# Patient Record
Sex: FEMALE | Race: WHITE | NOT HISPANIC OR LATINO | Employment: OTHER | URBAN - METROPOLITAN AREA
[De-identification: names, ages, dates, MRNs, and addresses within clinical notes are randomized per-mention and may not be internally consistent; named-entity substitution may affect disease eponyms.]

---

## 2019-01-01 ENCOUNTER — HOSPITAL ENCOUNTER (INPATIENT)
Facility: HOSPITAL | Age: 59
LOS: 1 days | DRG: 871 | End: 2019-05-23
Attending: EMERGENCY MEDICINE | Admitting: INTERNAL MEDICINE
Payer: MEDICARE

## 2019-01-01 VITALS
BODY MASS INDEX: 33.41 KG/M2 | TEMPERATURE: 94 F | HEART RATE: 31 BPM | WEIGHT: 207.88 LBS | RESPIRATION RATE: 30 BRPM | DIASTOLIC BLOOD PRESSURE: 56 MMHG | SYSTOLIC BLOOD PRESSURE: 63 MMHG | HEIGHT: 66 IN

## 2019-01-01 DIAGNOSIS — J96.01 ACUTE RESPIRATORY FAILURE WITH HYPOXIA AND HYPERCAPNIA: Primary | ICD-10-CM

## 2019-01-01 DIAGNOSIS — N17.9 AKI (ACUTE KIDNEY INJURY): ICD-10-CM

## 2019-01-01 DIAGNOSIS — R79.89 ELEVATED TROPONIN: ICD-10-CM

## 2019-01-01 DIAGNOSIS — I50.23 ACUTE ON CHRONIC SYSTOLIC HEART FAILURE: ICD-10-CM

## 2019-01-01 DIAGNOSIS — R40.2432 GLASGOW COMA SCALE TOTAL SCORE 3-8, AT ARRIVAL TO EMERGENCY DEPARTMENT: ICD-10-CM

## 2019-01-01 DIAGNOSIS — R57.0 CARDIOGENIC SHOCK: ICD-10-CM

## 2019-01-01 DIAGNOSIS — J96.02 ACUTE RESPIRATORY FAILURE WITH HYPOXIA AND HYPERCAPNIA: Primary | ICD-10-CM

## 2019-01-01 DIAGNOSIS — N17.9 ACUTE RENAL FAILURE, UNSPECIFIED ACUTE RENAL FAILURE TYPE: ICD-10-CM

## 2019-01-01 DIAGNOSIS — I50.9 ACUTE ON CHRONIC CONGESTIVE HEART FAILURE, UNSPECIFIED HEART FAILURE TYPE: ICD-10-CM

## 2019-01-01 DIAGNOSIS — J18.9 PNEUMONIA OF BOTH LUNGS DUE TO INFECTIOUS ORGANISM, UNSPECIFIED PART OF LUNG: ICD-10-CM

## 2019-01-01 DIAGNOSIS — E87.20 LACTIC ACIDOSIS: ICD-10-CM

## 2019-01-01 DIAGNOSIS — G93.1 HYPOXIC ENCEPHALOPATHY: ICD-10-CM

## 2019-01-01 DIAGNOSIS — R79.89 ABNORMAL LFTS: ICD-10-CM

## 2019-01-01 DIAGNOSIS — Z78.9 CENTRAL VENOUS CATHETER IN PLACE: ICD-10-CM

## 2019-01-01 DIAGNOSIS — I46.9 CARDIOPULMONARY ARREST WITH SUCCESSFUL RESUSCITATION: ICD-10-CM

## 2019-01-01 DIAGNOSIS — J96.01 ACUTE RESPIRATORY FAILURE WITH HYPOXIA AND HYPERCARBIA: ICD-10-CM

## 2019-01-01 DIAGNOSIS — R91.8 PULMONARY INFILTRATES ON CXR: ICD-10-CM

## 2019-01-01 DIAGNOSIS — A41.9 SEPSIS, DUE TO UNSPECIFIED ORGANISM: ICD-10-CM

## 2019-01-01 DIAGNOSIS — Z86.711 HISTORY OF PULMONARY EMBOLISM: Chronic | ICD-10-CM

## 2019-01-01 DIAGNOSIS — J96.02 ACUTE RESPIRATORY FAILURE WITH HYPOXIA AND HYPERCARBIA: ICD-10-CM

## 2019-01-01 DIAGNOSIS — E87.20 METABOLIC ACIDOSIS: ICD-10-CM

## 2019-01-01 DIAGNOSIS — K59.09 CHRONIC CONSTIPATION: Chronic | ICD-10-CM

## 2019-01-01 DIAGNOSIS — R79.89 ELEVATED BRAIN NATRIURETIC PEPTIDE (BNP) LEVEL: ICD-10-CM

## 2019-01-01 DIAGNOSIS — E87.29 METABOLIC ACIDOSIS, INCREASED ANION GAP (IAG): ICD-10-CM

## 2019-01-01 DIAGNOSIS — I46.9 CARDIAC ARREST: ICD-10-CM

## 2019-01-01 DIAGNOSIS — K72.00 SHOCK LIVER: ICD-10-CM

## 2019-01-01 DIAGNOSIS — Z95.2 HX OF REPLACEMENT OF AORTIC VALVE: Chronic | ICD-10-CM

## 2019-01-01 DIAGNOSIS — G40.909 SEIZURE DISORDER: Chronic | ICD-10-CM

## 2019-01-01 DIAGNOSIS — R57.9 SHOCK: ICD-10-CM

## 2019-01-01 LAB
ALBUMIN SERPL BCP-MCNC: 2.5 G/DL (ref 3.5–5.2)
ALBUMIN SERPL BCP-MCNC: 2.6 G/DL (ref 3.5–5.2)
ALBUMIN SERPL BCP-MCNC: 3.2 G/DL (ref 3.5–5.2)
ALLENS TEST: ABNORMAL
ALP SERPL-CCNC: 234 U/L (ref 55–135)
ALP SERPL-CCNC: 258 U/L (ref 55–135)
ALP SERPL-CCNC: 286 U/L (ref 55–135)
ALT SERPL W/O P-5'-P-CCNC: 1645 U/L (ref 10–44)
ALT SERPL W/O P-5'-P-CCNC: 1769 U/L (ref 10–44)
ALT SERPL W/O P-5'-P-CCNC: 796 U/L (ref 10–44)
AMPHET+METHAMPHET UR QL: NEGATIVE
ANION GAP SERPL CALC-SCNC: 28 MMOL/L (ref 8–16)
ANION GAP SERPL CALC-SCNC: 28 MMOL/L (ref 8–16)
ANION GAP SERPL CALC-SCNC: 29 MMOL/L (ref 8–16)
ANION GAP SERPL CALC-SCNC: 31 MMOL/L (ref 8–16)
ANION GAP SERPL CALC-SCNC: 32 MMOL/L (ref 8–16)
ANION GAP SERPL CALC-SCNC: 36 MMOL/L (ref 8–16)
ANISOCYTOSIS BLD QL SMEAR: SLIGHT
ANISOCYTOSIS BLD QL SMEAR: SLIGHT
AORTIC VALVE REGURGITATION: ABNORMAL
APTT BLDCRRT: 104.5 SEC (ref 21–32)
APTT BLDCRRT: 137.5 SEC (ref 21–32)
APTT BLDCRRT: 137.5 SEC (ref 21–32)
APTT BLDCRRT: 53.5 SEC (ref 21–32)
APTT BLDCRRT: 68.6 SEC (ref 21–32)
AST SERPL-CCNC: 2959 U/L (ref 10–40)
AST SERPL-CCNC: 3503 U/L (ref 10–40)
AST SERPL-CCNC: 650 U/L (ref 10–40)
BACTERIA #/AREA URNS HPF: ABNORMAL /HPF
BARBITURATES UR QL SCN>200 NG/ML: NEGATIVE
BASOPHILS # BLD AUTO: 0.02 K/UL (ref 0–0.2)
BASOPHILS # BLD AUTO: 0.04 K/UL (ref 0–0.2)
BASOPHILS NFR BLD: 0.1 % (ref 0–1.9)
BASOPHILS NFR BLD: 0.4 % (ref 0–1.9)
BENZODIAZ UR QL SCN>200 NG/ML: NORMAL
BILIRUB SERPL-MCNC: 1.4 MG/DL (ref 0.1–1)
BILIRUB SERPL-MCNC: 2.1 MG/DL (ref 0.1–1)
BILIRUB SERPL-MCNC: 2.2 MG/DL (ref 0.1–1)
BILIRUB UR QL STRIP: NEGATIVE
BNP SERPL-MCNC: 2918 PG/ML (ref 0–99)
BUN SERPL-MCNC: 13 MG/DL (ref 6–20)
BUN SERPL-MCNC: 13 MG/DL (ref 6–20)
BUN SERPL-MCNC: 14 MG/DL (ref 6–20)
BUN SERPL-MCNC: 17 MG/DL (ref 6–20)
BUN SERPL-MCNC: 18 MG/DL (ref 6–20)
BUN SERPL-MCNC: 19 MG/DL (ref 6–20)
BURR CELLS BLD QL SMEAR: ABNORMAL
BURR CELLS BLD QL SMEAR: ABNORMAL
BZE UR QL SCN: NEGATIVE
CALCIUM SERPL-MCNC: 7 MG/DL (ref 8.7–10.5)
CALCIUM SERPL-MCNC: 7.4 MG/DL (ref 8.7–10.5)
CALCIUM SERPL-MCNC: 7.6 MG/DL (ref 8.7–10.5)
CALCIUM SERPL-MCNC: 7.9 MG/DL (ref 8.7–10.5)
CALCIUM SERPL-MCNC: 8 MG/DL (ref 8.7–10.5)
CALCIUM SERPL-MCNC: 9.1 MG/DL (ref 8.7–10.5)
CANNABINOIDS UR QL SCN: NEGATIVE
CHLORIDE SERPL-SCNC: 87 MMOL/L (ref 95–110)
CHLORIDE SERPL-SCNC: 94 MMOL/L (ref 95–110)
CHLORIDE SERPL-SCNC: 94 MMOL/L (ref 95–110)
CHLORIDE SERPL-SCNC: 96 MMOL/L (ref 95–110)
CHLORIDE SERPL-SCNC: 97 MMOL/L (ref 95–110)
CK SERPL-CCNC: 130 U/L (ref 20–180)
CLARITY UR: CLEAR
CO2 SERPL-SCNC: 5 MMOL/L (ref 23–29)
CO2 SERPL-SCNC: 7 MMOL/L (ref 23–29)
CO2 SERPL-SCNC: 7 MMOL/L (ref 23–29)
CO2 SERPL-SCNC: 8 MMOL/L (ref 23–29)
CO2 SERPL-SCNC: 8 MMOL/L (ref 23–29)
COLOR UR: YELLOW
CREAT SERPL-MCNC: 1.9 MG/DL (ref 0.5–1.4)
CREAT SERPL-MCNC: 2 MG/DL (ref 0.5–1.4)
CREAT SERPL-MCNC: 2.1 MG/DL (ref 0.5–1.4)
CREAT SERPL-MCNC: 2.2 MG/DL (ref 0.5–1.4)
CREAT SERPL-MCNC: 2.4 MG/DL (ref 0.5–1.4)
CREAT UR-MCNC: 63.5 MG/DL (ref 15–325)
DELSYS: ABNORMAL
DIFFERENTIAL METHOD: ABNORMAL
DIFFERENTIAL METHOD: ABNORMAL
EOSINOPHIL # BLD AUTO: 0 K/UL (ref 0–0.5)
EOSINOPHIL # BLD AUTO: 0 K/UL (ref 0–0.5)
EOSINOPHIL NFR BLD: 0.1 % (ref 0–8)
EOSINOPHIL NFR BLD: 0.3 % (ref 0–8)
ERYTHROCYTE [DISTWIDTH] IN BLOOD BY AUTOMATED COUNT: 15.5 % (ref 11.5–14.5)
ERYTHROCYTE [DISTWIDTH] IN BLOOD BY AUTOMATED COUNT: 15.8 % (ref 11.5–14.5)
ERYTHROCYTE [SEDIMENTATION RATE] IN BLOOD BY WESTERGREN METHOD: 16 MM/H
ERYTHROCYTE [SEDIMENTATION RATE] IN BLOOD BY WESTERGREN METHOD: 24 MM/H
ERYTHROCYTE [SEDIMENTATION RATE] IN BLOOD BY WESTERGREN METHOD: 30 MM/H
EST. GFR  (AFRICAN AMERICAN): 25 ML/MIN/1.73 M^2
EST. GFR  (AFRICAN AMERICAN): 28 ML/MIN/1.73 M^2
EST. GFR  (AFRICAN AMERICAN): 30 ML/MIN/1.73 M^2
EST. GFR  (AFRICAN AMERICAN): 31 ML/MIN/1.73 M^2
EST. GFR  (AFRICAN AMERICAN): 33 ML/MIN/1.73 M^2
EST. GFR  (NON AFRICAN AMERICAN): 22 ML/MIN/1.73 M^2
EST. GFR  (NON AFRICAN AMERICAN): 24 ML/MIN/1.73 M^2
EST. GFR  (NON AFRICAN AMERICAN): 26 ML/MIN/1.73 M^2
EST. GFR  (NON AFRICAN AMERICAN): 27 ML/MIN/1.73 M^2
EST. GFR  (NON AFRICAN AMERICAN): 29 ML/MIN/1.73 M^2
ESTIMATED AVG GLUCOSE: 100 MG/DL (ref 68–131)
ESTIMATED PA SYSTOLIC PRESSURE: 69.22
FIO2: 100
FIO2: 40
FIO2: 40
FIO2: 55
GLOBAL PERICARDIAL EFFUSION: ABNORMAL
GLUCOSE SERPL-MCNC: 120 MG/DL (ref 70–110)
GLUCOSE SERPL-MCNC: 136 MG/DL (ref 70–110)
GLUCOSE SERPL-MCNC: 182 MG/DL (ref 70–110)
GLUCOSE SERPL-MCNC: 232 MG/DL (ref 70–110)
GLUCOSE SERPL-MCNC: 67 MG/DL (ref 70–110)
GLUCOSE SERPL-MCNC: 79 MG/DL (ref 70–110)
GLUCOSE SERPL-MCNC: 90 MG/DL (ref 70–110)
GLUCOSE SERPL-MCNC: 92 MG/DL (ref 70–110)
GLUCOSE UR QL STRIP: NEGATIVE
HBA1C MFR BLD HPLC: 5.1 % (ref 4–5.6)
HCO3 UR-SCNC: 10.9 MMOL/L (ref 24–28)
HCO3 UR-SCNC: 6.1 MMOL/L (ref 24–28)
HCO3 UR-SCNC: 6.9 MMOL/L (ref 24–28)
HCO3 UR-SCNC: 7.1 MMOL/L (ref 24–28)
HCO3 UR-SCNC: 7.5 MMOL/L (ref 24–28)
HCO3 UR-SCNC: 8.1 MMOL/L (ref 24–28)
HCO3 UR-SCNC: 8.6 MMOL/L (ref 24–28)
HCO3 UR-SCNC: 9.2 MMOL/L (ref 24–28)
HCT VFR BLD AUTO: 43.9 % (ref 37–48.5)
HCT VFR BLD AUTO: 47.5 % (ref 37–48.5)
HCT VFR BLD CALC: 36 %PCV (ref 36–54)
HCT VFR BLD CALC: 45 %PCV (ref 36–54)
HCV AB SERPL QL IA: NEGATIVE
HGB BLD-MCNC: 12.6 G/DL (ref 12–16)
HGB BLD-MCNC: 14.2 G/DL (ref 12–16)
HGB UR QL STRIP: NEGATIVE
HIV 1+2 AB+HIV1 P24 AG SERPL QL IA: NEGATIVE
HYALINE CASTS #/AREA URNS LPF: 0 /LPF
INR PPP: 1.8 (ref 0.8–1.2)
INR PPP: 3.2 (ref 0.8–1.2)
KETONES UR QL STRIP: ABNORMAL
LACTATE SERPL-SCNC: >12 MMOL/L (ref 0.5–2.2)
LEUKOCYTE ESTERASE UR QL STRIP: NEGATIVE
LYMPHOCYTES # BLD AUTO: 2 K/UL (ref 1–4.8)
LYMPHOCYTES # BLD AUTO: 2.4 K/UL (ref 1–4.8)
LYMPHOCYTES NFR BLD: 11.9 % (ref 18–48)
LYMPHOCYTES NFR BLD: 21.2 % (ref 18–48)
MAGNESIUM SERPL-MCNC: 1.6 MG/DL (ref 1.6–2.6)
MAGNESIUM SERPL-MCNC: 2.2 MG/DL (ref 1.6–2.6)
MAGNESIUM SERPL-MCNC: 2.2 MG/DL (ref 1.6–2.6)
MAGNESIUM SERPL-MCNC: 2.4 MG/DL (ref 1.6–2.6)
MAGNESIUM SERPL-MCNC: 3.1 MG/DL (ref 1.6–2.6)
MAGNESIUM SERPL-MCNC: 3.2 MG/DL (ref 1.6–2.6)
MCH RBC QN AUTO: 31.2 PG (ref 27–31)
MCH RBC QN AUTO: 31.3 PG (ref 27–31)
MCHC RBC AUTO-ENTMCNC: 28.7 G/DL (ref 32–36)
MCHC RBC AUTO-ENTMCNC: 29.9 G/DL (ref 32–36)
MCV RBC AUTO: 105 FL (ref 82–98)
MCV RBC AUTO: 109 FL (ref 82–98)
METHADONE UR QL SCN>300 NG/ML: NEGATIVE
MICROSCOPIC COMMENT: ABNORMAL
MITRAL VALVE MOBILITY: ABNORMAL
MITRAL VALVE STENOSIS: ABNORMAL
MODE: ABNORMAL
MONOCYTES # BLD AUTO: 1 K/UL (ref 0.3–1)
MONOCYTES # BLD AUTO: 1.5 K/UL (ref 0.3–1)
MONOCYTES NFR BLD: 13.3 % (ref 4–15)
MONOCYTES NFR BLD: 6.2 % (ref 4–15)
NEUTROPHILS # BLD AUTO: 13.7 K/UL (ref 1.8–7.7)
NEUTROPHILS # BLD AUTO: 7.4 K/UL (ref 1.8–7.7)
NEUTROPHILS NFR BLD: 68.3 % (ref 38–73)
NEUTROPHILS NFR BLD: 83.5 % (ref 38–73)
NITRITE UR QL STRIP: NEGATIVE
OPIATES UR QL SCN: NORMAL
PCO2 BLDA: 27.6 MMHG (ref 35–45)
PCO2 BLDA: 34.9 MMHG (ref 35–45)
PCO2 BLDA: 36.5 MMHG (ref 35–45)
PCO2 BLDA: 40.8 MMHG (ref 35–45)
PCO2 BLDA: 42.7 MMHG (ref 35–45)
PCO2 BLDA: 45 MMHG (ref 35–45)
PCO2 BLDA: 45.7 MMHG (ref 35–45)
PCO2 BLDA: 53.6 MMHG (ref 35–45)
PCP UR QL SCN>25 NG/ML: NEGATIVE
PEEP: 10
PEEP: 12
PH SMN: 6.83 [PH] (ref 7.35–7.45)
PH SMN: 6.85 [PH] (ref 7.35–7.45)
PH SMN: 6.91 [PH] (ref 7.35–7.45)
PH SMN: 6.92 [PH] (ref 7.35–7.45)
PH SMN: 6.95 [PH] (ref 7.35–7.45)
PH UR STRIP: 6 [PH] (ref 5–8)
PHOSPHATE SERPL-MCNC: 10.7 MG/DL (ref 2.7–4.5)
PHOSPHATE SERPL-MCNC: 8.1 MG/DL (ref 2.7–4.5)
PHOSPHATE SERPL-MCNC: 8.7 MG/DL (ref 2.7–4.5)
PLATELET # BLD AUTO: 167 K/UL (ref 150–350)
PLATELET # BLD AUTO: 84 K/UL (ref 150–350)
PLATELET BLD QL SMEAR: ABNORMAL
PMV BLD AUTO: 11.7 FL (ref 9.2–12.9)
PMV BLD AUTO: 11.8 FL (ref 9.2–12.9)
PO2 BLDA: 117 MMHG (ref 80–100)
PO2 BLDA: 120 MMHG (ref 80–100)
PO2 BLDA: 142 MMHG (ref 80–100)
PO2 BLDA: 175 MMHG (ref 80–100)
PO2 BLDA: 192 MMHG (ref 80–100)
PO2 BLDA: 396 MMHG (ref 80–100)
PO2 BLDA: 85 MMHG (ref 80–100)
PO2 BLDA: 89 MMHG (ref 80–100)
POC BE: -22 MMOL/L
POC BE: -23 MMOL/L
POC BE: -24 MMOL/L
POC BE: -25 MMOL/L
POC BE: -26 MMOL/L
POC BE: -27 MMOL/L
POC IONIZED CALCIUM: 0.91 MMOL/L (ref 1.06–1.42)
POC IONIZED CALCIUM: 1.01 MMOL/L (ref 1.06–1.42)
POC SATURATED O2: 100 % (ref 95–100)
POC SATURATED O2: 87 % (ref 95–100)
POC SATURATED O2: 88 % (ref 95–100)
POC SATURATED O2: 94 % (ref 95–100)
POC SATURATED O2: 95 % (ref 95–100)
POC SATURATED O2: 97 % (ref 95–100)
POC SATURATED O2: 98 % (ref 95–100)
POC SATURATED O2: 99 % (ref 95–100)
POCT GLUCOSE: 152 MG/DL (ref 70–110)
POCT GLUCOSE: 171 MG/DL (ref 70–110)
POCT GLUCOSE: 199 MG/DL (ref 70–110)
POCT GLUCOSE: 239 MG/DL (ref 70–110)
POCT GLUCOSE: 61 MG/DL (ref 70–110)
POCT GLUCOSE: 70 MG/DL (ref 70–110)
POCT GLUCOSE: 76 MG/DL (ref 70–110)
POCT GLUCOSE: 89 MG/DL (ref 70–110)
POCT GLUCOSE: 97 MG/DL (ref 70–110)
POCT GLUCOSE: 98 MG/DL (ref 70–110)
POIKILOCYTOSIS BLD QL SMEAR: SLIGHT
POIKILOCYTOSIS BLD QL SMEAR: SLIGHT
POLYCHROMASIA BLD QL SMEAR: ABNORMAL
POLYCHROMASIA BLD QL SMEAR: ABNORMAL
POTASSIUM BLD-SCNC: 4.2 MMOL/L (ref 3.5–5.1)
POTASSIUM BLD-SCNC: 5 MMOL/L (ref 3.5–5.1)
POTASSIUM SERPL-SCNC: 4 MMOL/L (ref 3.5–5.1)
POTASSIUM SERPL-SCNC: 4.2 MMOL/L (ref 3.5–5.1)
POTASSIUM SERPL-SCNC: 4.3 MMOL/L (ref 3.5–5.1)
POTASSIUM SERPL-SCNC: 4.6 MMOL/L (ref 3.5–5.1)
POTASSIUM SERPL-SCNC: 4.7 MMOL/L (ref 3.5–5.1)
POTASSIUM SERPL-SCNC: 5.6 MMOL/L (ref 3.5–5.1)
PROT SERPL-MCNC: 5.5 G/DL (ref 6–8.4)
PROT SERPL-MCNC: 5.8 G/DL (ref 6–8.4)
PROT SERPL-MCNC: 7.1 G/DL (ref 6–8.4)
PROT UR QL STRIP: ABNORMAL
PROTHROMBIN TIME: 19.6 SEC (ref 9–12.5)
PROTHROMBIN TIME: 32.9 SEC (ref 9–12.5)
RBC # BLD AUTO: 4.04 M/UL (ref 4–5.4)
RBC # BLD AUTO: 4.54 M/UL (ref 4–5.4)
RBC #/AREA URNS HPF: 8 /HPF (ref 0–4)
RETIRED EF AND QEF - SEE NOTES: 20 (ref 55–65)
SAMPLE: ABNORMAL
SITE: ABNORMAL
SODIUM BLD-SCNC: 127 MMOL/L (ref 136–145)
SODIUM BLD-SCNC: 130 MMOL/L (ref 136–145)
SODIUM SERPL-SCNC: 130 MMOL/L (ref 136–145)
SODIUM SERPL-SCNC: 131 MMOL/L (ref 136–145)
SODIUM SERPL-SCNC: 131 MMOL/L (ref 136–145)
SODIUM SERPL-SCNC: 132 MMOL/L (ref 136–145)
SODIUM SERPL-SCNC: 132 MMOL/L (ref 136–145)
SODIUM SERPL-SCNC: 133 MMOL/L (ref 136–145)
SP GR UR STRIP: >=1.03 (ref 1–1.03)
TARGETS BLD QL SMEAR: ABNORMAL
TOXICOLOGY INFORMATION: NORMAL
TRICUSPID VALVE REGURGITATION: ABNORMAL
TROPONIN I SERPL DL<=0.01 NG/ML-MCNC: 0.07 NG/ML (ref 0–0.03)
TROPONIN I SERPL DL<=0.01 NG/ML-MCNC: 0.44 NG/ML (ref 0–0.03)
TSH SERPL DL<=0.005 MIU/L-ACNC: 1.54 UIU/ML (ref 0.4–4)
URN SPEC COLLECT METH UR: ABNORMAL
UROBILINOGEN UR STRIP-ACNC: ABNORMAL EU/DL
VT: 350
VT: 400
VT: 400
WBC # BLD AUTO: 11.39 K/UL (ref 3.9–12.7)
WBC # BLD AUTO: 16.79 K/UL (ref 3.9–12.7)
WBC #/AREA URNS HPF: 1 /HPF (ref 0–5)

## 2019-01-01 PROCEDURE — 37799 UNLISTED PX VASCULAR SURGERY: CPT

## 2019-01-01 PROCEDURE — 99292 CRITICAL CARE ADDL 30 MIN: CPT | Mod: ,,, | Performed by: NURSE PRACTITIONER

## 2019-01-01 PROCEDURE — 90937 PR HEMODIALYSIS, REPEATED EVAL.: ICD-10-PCS | Mod: ,,, | Performed by: INTERNAL MEDICINE

## 2019-01-01 PROCEDURE — 80048 BASIC METABOLIC PNL TOTAL CA: CPT

## 2019-01-01 PROCEDURE — 83520 IMMUNOASSAY QUANT NOS NONAB: CPT

## 2019-01-01 PROCEDURE — 63600175 PHARM REV CODE 636 W HCPCS: Performed by: INTERNAL MEDICINE

## 2019-01-01 PROCEDURE — 93010 ELECTROCARDIOGRAM REPORT: CPT | Mod: ,,, | Performed by: INTERNAL MEDICINE

## 2019-01-01 PROCEDURE — 25000242 PHARM REV CODE 250 ALT 637 W/ HCPCS: Performed by: INTERNAL MEDICINE

## 2019-01-01 PROCEDURE — 83605 ASSAY OF LACTIC ACID: CPT

## 2019-01-01 PROCEDURE — 36620 INSERTION CATHETER ARTERY: CPT | Mod: 59,,, | Performed by: NURSE PRACTITIONER

## 2019-01-01 PROCEDURE — 82800 BLOOD PH: CPT

## 2019-01-01 PROCEDURE — 80307 DRUG TEST PRSMV CHEM ANLYZR: CPT

## 2019-01-01 PROCEDURE — 99900035 HC TECH TIME PER 15 MIN (STAT)

## 2019-01-01 PROCEDURE — 36556 PR INSERT NON-TUNNEL CV CATH 5+ YRS OLD: ICD-10-PCS | Mod: ,,, | Performed by: NURSE PRACTITIONER

## 2019-01-01 PROCEDURE — 85610 PROTHROMBIN TIME: CPT

## 2019-01-01 PROCEDURE — 83735 ASSAY OF MAGNESIUM: CPT | Mod: 91

## 2019-01-01 PROCEDURE — 99900026 HC AIRWAY MAINTENANCE (STAT)

## 2019-01-01 PROCEDURE — 63600175 PHARM REV CODE 636 W HCPCS: Performed by: NURSE PRACTITIONER

## 2019-01-01 PROCEDURE — 93010 EKG 12-LEAD: ICD-10-PCS | Mod: ,,, | Performed by: INTERNAL MEDICINE

## 2019-01-01 PROCEDURE — 99291 PR CRITICAL CARE, E/M 30-74 MINUTES: ICD-10-PCS | Mod: 25,,, | Performed by: NURSE PRACTITIONER

## 2019-01-01 PROCEDURE — 83880 ASSAY OF NATRIURETIC PEPTIDE: CPT

## 2019-01-01 PROCEDURE — 25000003 PHARM REV CODE 250: Performed by: INTERNAL MEDICINE

## 2019-01-01 PROCEDURE — 84295 ASSAY OF SERUM SODIUM: CPT

## 2019-01-01 PROCEDURE — 99291 CRITICAL CARE FIRST HOUR: CPT | Mod: ,,, | Performed by: INTERNAL MEDICINE

## 2019-01-01 PROCEDURE — 99292 PR CRITICAL CARE, ADDL 30 MIN: ICD-10-PCS | Mod: 25,,, | Performed by: NURSE PRACTITIONER

## 2019-01-01 PROCEDURE — 80053 COMPREHEN METABOLIC PANEL: CPT | Mod: 91

## 2019-01-01 PROCEDURE — 84100 ASSAY OF PHOSPHORUS: CPT

## 2019-01-01 PROCEDURE — 36415 COLL VENOUS BLD VENIPUNCTURE: CPT

## 2019-01-01 PROCEDURE — 85730 THROMBOPLASTIN TIME PARTIAL: CPT | Mod: 91

## 2019-01-01 PROCEDURE — 93306 2D ECHO WITH COLOR FLOW DOPPLER: ICD-10-PCS | Mod: 26,,, | Performed by: INTERNAL MEDICINE

## 2019-01-01 PROCEDURE — 93005 ELECTROCARDIOGRAM TRACING: CPT

## 2019-01-01 PROCEDURE — 36600 WITHDRAWAL OF ARTERIAL BLOOD: CPT

## 2019-01-01 PROCEDURE — 80053 COMPREHEN METABOLIC PANEL: CPT

## 2019-01-01 PROCEDURE — 87070 CULTURE OTHR SPECIMN AEROBIC: CPT

## 2019-01-01 PROCEDURE — 83735 ASSAY OF MAGNESIUM: CPT

## 2019-01-01 PROCEDURE — 82803 BLOOD GASES ANY COMBINATION: CPT

## 2019-01-01 PROCEDURE — 99291 CRITICAL CARE FIRST HOUR: CPT | Mod: 25,,, | Performed by: INTERNAL MEDICINE

## 2019-01-01 PROCEDURE — 87040 BLOOD CULTURE FOR BACTERIA: CPT | Mod: 59

## 2019-01-01 PROCEDURE — 90937 HEMODIALYSIS REPEATED EVAL: CPT | Mod: ,,, | Performed by: INTERNAL MEDICINE

## 2019-01-01 PROCEDURE — 25000003 PHARM REV CODE 250: Performed by: NURSE PRACTITIONER

## 2019-01-01 PROCEDURE — 83036 HEMOGLOBIN GLYCOSYLATED A1C: CPT

## 2019-01-01 PROCEDURE — 99291 PR CRITICAL CARE, E/M 30-74 MINUTES: ICD-10-PCS | Mod: 25,,, | Performed by: INTERNAL MEDICINE

## 2019-01-01 PROCEDURE — 99291 PR CRITICAL CARE, E/M 30-74 MINUTES: ICD-10-PCS | Mod: ,,, | Performed by: NURSE PRACTITIONER

## 2019-01-01 PROCEDURE — 82330 ASSAY OF CALCIUM: CPT

## 2019-01-01 PROCEDURE — 36556 INSERT NON-TUNNEL CV CATH: CPT | Mod: ,,, | Performed by: NURSE PRACTITIONER

## 2019-01-01 PROCEDURE — 94640 AIRWAY INHALATION TREATMENT: CPT

## 2019-01-01 PROCEDURE — 85025 COMPLETE CBC W/AUTO DIFF WBC: CPT

## 2019-01-01 PROCEDURE — 93306 TTE W/DOPPLER COMPLETE: CPT | Mod: 26,,, | Performed by: INTERNAL MEDICINE

## 2019-01-01 PROCEDURE — 85730 THROMBOPLASTIN TIME PARTIAL: CPT

## 2019-01-01 PROCEDURE — 84484 ASSAY OF TROPONIN QUANT: CPT

## 2019-01-01 PROCEDURE — 99291 CRITICAL CARE FIRST HOUR: CPT | Mod: 25,,, | Performed by: NURSE PRACTITIONER

## 2019-01-01 PROCEDURE — 90945 DIALYSIS ONE EVALUATION: CPT

## 2019-01-01 PROCEDURE — 36620 PR INSERT CATH,ART,PERCUT,SHORTTERM: ICD-10-PCS | Mod: 59,,, | Performed by: NURSE PRACTITIONER

## 2019-01-01 PROCEDURE — 84100 ASSAY OF PHOSPHORUS: CPT | Mod: 91

## 2019-01-01 PROCEDURE — 20000000 HC ICU ROOM

## 2019-01-01 PROCEDURE — 96365 THER/PROPH/DIAG IV INF INIT: CPT | Mod: 59

## 2019-01-01 PROCEDURE — 63600175 PHARM REV CODE 636 W HCPCS: Performed by: EMERGENCY MEDICINE

## 2019-01-01 PROCEDURE — 96367 TX/PROPH/DG ADDL SEQ IV INF: CPT

## 2019-01-01 PROCEDURE — 87205 SMEAR GRAM STAIN: CPT

## 2019-01-01 PROCEDURE — 84132 ASSAY OF SERUM POTASSIUM: CPT

## 2019-01-01 PROCEDURE — 51702 INSERT TEMP BLADDER CATH: CPT

## 2019-01-01 PROCEDURE — S0028 INJECTION, FAMOTIDINE, 20 MG: HCPCS | Performed by: INTERNAL MEDICINE

## 2019-01-01 PROCEDURE — 84484 ASSAY OF TROPONIN QUANT: CPT | Mod: 91

## 2019-01-01 PROCEDURE — 94002 VENT MGMT INPAT INIT DAY: CPT

## 2019-01-01 PROCEDURE — 83605 ASSAY OF LACTIC ACID: CPT | Mod: 91

## 2019-01-01 PROCEDURE — 86703 HIV-1/HIV-2 1 RESULT ANTBDY: CPT

## 2019-01-01 PROCEDURE — 84443 ASSAY THYROID STIM HORMONE: CPT

## 2019-01-01 PROCEDURE — 93306 TTE W/DOPPLER COMPLETE: CPT

## 2019-01-01 PROCEDURE — 80048 BASIC METABOLIC PNL TOTAL CA: CPT | Mod: 91

## 2019-01-01 PROCEDURE — 36620 INSERTION CATHETER ARTERY: CPT

## 2019-01-01 PROCEDURE — 99291 CRITICAL CARE FIRST HOUR: CPT | Mod: ,,, | Performed by: NURSE PRACTITIONER

## 2019-01-01 PROCEDURE — 82550 ASSAY OF CK (CPK): CPT

## 2019-01-01 PROCEDURE — 94003 VENT MGMT INPAT SUBQ DAY: CPT

## 2019-01-01 PROCEDURE — 81000 URINALYSIS NONAUTO W/SCOPE: CPT | Mod: 59

## 2019-01-01 PROCEDURE — 93041 RHYTHM ECG TRACING: CPT

## 2019-01-01 PROCEDURE — 36556 INSERT NON-TUNNEL CV CATH: CPT

## 2019-01-01 PROCEDURE — 99291 CRITICAL CARE FIRST HOUR: CPT

## 2019-01-01 PROCEDURE — 99291 PR CRITICAL CARE, E/M 30-74 MINUTES: ICD-10-PCS | Mod: ,,, | Performed by: INTERNAL MEDICINE

## 2019-01-01 PROCEDURE — 99292 PR CRITICAL CARE, ADDL 30 MIN: ICD-10-PCS | Mod: ,,, | Performed by: NURSE PRACTITIONER

## 2019-01-01 PROCEDURE — 86803 HEPATITIS C AB TEST: CPT

## 2019-01-01 PROCEDURE — 85014 HEMATOCRIT: CPT

## 2019-01-01 PROCEDURE — 96361 HYDRATE IV INFUSION ADD-ON: CPT

## 2019-01-01 PROCEDURE — 25000003 PHARM REV CODE 250: Performed by: EMERGENCY MEDICINE

## 2019-01-01 PROCEDURE — 99292 CRITICAL CARE ADDL 30 MIN: CPT | Mod: 25,,, | Performed by: NURSE PRACTITIONER

## 2019-01-01 PROCEDURE — 96375 TX/PRO/DX INJ NEW DRUG ADDON: CPT

## 2019-01-01 PROCEDURE — 82962 GLUCOSE BLOOD TEST: CPT

## 2019-01-01 RX ORDER — MAGNESIUM SULFATE HEPTAHYDRATE 40 MG/ML
2 INJECTION, SOLUTION INTRAVENOUS
Status: DISCONTINUED | OUTPATIENT
Start: 2019-01-01 | End: 2019-01-01

## 2019-01-01 RX ORDER — SODIUM CHLORIDE 9 MG/ML
INJECTION, SOLUTION INTRAVENOUS
Status: COMPLETED | OUTPATIENT
Start: 2019-01-01 | End: 2019-01-01

## 2019-01-01 RX ORDER — DIVALPROEX SODIUM 125 MG/1
250 CAPSULE, COATED PELLETS ORAL 3 TIMES DAILY
Status: ON HOLD | COMMUNITY
End: 2019-01-01 | Stop reason: HOSPADM

## 2019-01-01 RX ORDER — CHLORHEXIDINE GLUCONATE ORAL RINSE 1.2 MG/ML
15 SOLUTION DENTAL 2 TIMES DAILY
Status: DISCONTINUED | OUTPATIENT
Start: 2019-01-01 | End: 2019-01-01

## 2019-01-01 RX ORDER — MAGNESIUM SULFATE HEPTAHYDRATE 40 MG/ML
2 INJECTION, SOLUTION INTRAVENOUS ONCE
Status: COMPLETED | OUTPATIENT
Start: 2019-01-01 | End: 2019-01-01

## 2019-01-01 RX ORDER — INDOMETHACIN 25 MG/1
50 CAPSULE ORAL ONCE
Status: COMPLETED | OUTPATIENT
Start: 2019-01-01 | End: 2019-01-01

## 2019-01-01 RX ORDER — OXYCODONE HYDROCHLORIDE 15 MG/1
15 TABLET ORAL 4 TIMES DAILY
Status: ON HOLD | COMMUNITY
End: 2019-01-01 | Stop reason: HOSPADM

## 2019-01-01 RX ORDER — SODIUM CHLORIDE 0.9 % (FLUSH) 0.9 %
10 SYRINGE (ML) INJECTION
Status: DISCONTINUED | OUTPATIENT
Start: 2019-01-01 | End: 2019-01-01 | Stop reason: HOSPADM

## 2019-01-01 RX ORDER — FUROSEMIDE 10 MG/ML
80 INJECTION INTRAMUSCULAR; INTRAVENOUS
Status: COMPLETED | OUTPATIENT
Start: 2019-01-01 | End: 2019-01-01

## 2019-01-01 RX ORDER — PANTOPRAZOLE SODIUM 20 MG/1
20 TABLET, DELAYED RELEASE ORAL 2 TIMES DAILY
Status: ON HOLD | COMMUNITY
End: 2019-01-01 | Stop reason: HOSPADM

## 2019-01-01 RX ORDER — SPIRONOLACTONE 50 MG/1
50 TABLET, FILM COATED ORAL 2 TIMES DAILY
Status: ON HOLD | COMMUNITY
End: 2019-01-01 | Stop reason: HOSPADM

## 2019-01-01 RX ORDER — GLUCAGON 1 MG
1 KIT INJECTION
Status: DISCONTINUED | OUTPATIENT
Start: 2019-01-01 | End: 2019-01-01

## 2019-01-01 RX ORDER — POTASSIUM CHLORIDE 29.8 MG/ML
80 INJECTION INTRAVENOUS
Status: DISCONTINUED | OUTPATIENT
Start: 2019-01-01 | End: 2019-01-01

## 2019-01-01 RX ORDER — DILTIAZEM HYDROCHLORIDE 60 MG/1
60 TABLET, FILM COATED ORAL DAILY
Status: ON HOLD | COMMUNITY
End: 2019-01-01 | Stop reason: HOSPADM

## 2019-01-01 RX ORDER — POTASSIUM CHLORIDE 1.5 G/1.58G
20 POWDER, FOR SOLUTION ORAL 4 TIMES DAILY
Status: ON HOLD | COMMUNITY
End: 2019-01-01 | Stop reason: HOSPADM

## 2019-01-01 RX ORDER — DOPAMINE HYDROCHLORIDE 160 MG/100ML
5 INJECTION, SOLUTION INTRAVENOUS CONTINUOUS
Status: DISCONTINUED | OUTPATIENT
Start: 2019-01-01 | End: 2019-01-01

## 2019-01-01 RX ORDER — ATORVASTATIN CALCIUM 20 MG/1
20 TABLET, FILM COATED ORAL DAILY
Status: ON HOLD | COMMUNITY
End: 2019-01-01 | Stop reason: HOSPADM

## 2019-01-01 RX ORDER — PREGABALIN 200 MG/1
200 CAPSULE ORAL 3 TIMES DAILY
Status: ON HOLD | COMMUNITY
End: 2019-01-01 | Stop reason: HOSPADM

## 2019-01-01 RX ORDER — HEPARIN SODIUM 5000 [USP'U]/ML
5000 INJECTION, SOLUTION INTRAVENOUS; SUBCUTANEOUS EVERY 8 HOURS
Status: DISCONTINUED | OUTPATIENT
Start: 2019-01-01 | End: 2019-01-01

## 2019-01-01 RX ORDER — IBUPROFEN 400 MG/1
400 TABLET ORAL EVERY 6 HOURS PRN
Status: DISCONTINUED | OUTPATIENT
Start: 2019-01-01 | End: 2019-01-01

## 2019-01-01 RX ORDER — LEVETIRACETAM 5 MG/ML
500 INJECTION INTRAVASCULAR EVERY 12 HOURS
Status: DISCONTINUED | OUTPATIENT
Start: 2019-01-01 | End: 2019-01-01

## 2019-01-01 RX ORDER — FUROSEMIDE 80 MG/1
80 TABLET ORAL 2 TIMES DAILY
Status: ON HOLD | COMMUNITY
End: 2019-01-01 | Stop reason: HOSPADM

## 2019-01-01 RX ORDER — IPRATROPIUM BROMIDE AND ALBUTEROL SULFATE 2.5; .5 MG/3ML; MG/3ML
3 SOLUTION RESPIRATORY (INHALATION)
Status: DISCONTINUED | OUTPATIENT
Start: 2019-01-01 | End: 2019-01-01

## 2019-01-01 RX ORDER — HEPARIN SODIUM 10000 [USP'U]/100ML
250 INJECTION, SOLUTION INTRAVENOUS CONTINUOUS
Status: DISCONTINUED | OUTPATIENT
Start: 2019-01-01 | End: 2019-01-01

## 2019-01-01 RX ORDER — LEVETIRACETAM 100 MG/ML
500 SOLUTION ORAL 2 TIMES DAILY
Status: DISCONTINUED | OUTPATIENT
Start: 2019-01-01 | End: 2019-01-01

## 2019-01-01 RX ORDER — BISACODYL 10 MG
10 SUPPOSITORY, RECTAL RECTAL DAILY PRN
Status: DISCONTINUED | OUTPATIENT
Start: 2019-01-01 | End: 2019-01-01

## 2019-01-01 RX ORDER — FENTANYL CITRAT/DEXTROSE 5%/PF 100 MCG/10
PATIENT CONTROLLED ANALGESIA SYRINGE INTRAVENOUS CONTINUOUS
Status: DISCONTINUED | OUTPATIENT
Start: 2019-01-01 | End: 2019-01-01

## 2019-01-01 RX ORDER — POTASSIUM CHLORIDE 29.8 MG/ML
40 INJECTION INTRAVENOUS
Status: DISCONTINUED | OUTPATIENT
Start: 2019-01-01 | End: 2019-01-01

## 2019-01-01 RX ORDER — VENLAFAXINE HYDROCHLORIDE 150 MG/1
150 CAPSULE, EXTENDED RELEASE ORAL 2 TIMES DAILY
Status: ON HOLD | COMMUNITY
End: 2019-01-01 | Stop reason: HOSPADM

## 2019-01-01 RX ORDER — MAGNESIUM SULFATE HEPTAHYDRATE 40 MG/ML
2 INJECTION, SOLUTION INTRAVENOUS
Status: COMPLETED | OUTPATIENT
Start: 2019-01-01 | End: 2019-01-01

## 2019-01-01 RX ORDER — FAMOTIDINE 10 MG/ML
20 INJECTION INTRAVENOUS DAILY
Status: DISCONTINUED | OUTPATIENT
Start: 2019-01-01 | End: 2019-01-01

## 2019-01-01 RX ORDER — HEPARIN SODIUM,PORCINE/D5W 25000/250
18 INTRAVENOUS SOLUTION INTRAVENOUS CONTINUOUS
Status: DISCONTINUED | OUTPATIENT
Start: 2019-01-01 | End: 2019-01-01

## 2019-01-01 RX ORDER — POLYETHYLENE GLYCOL 3350 17 G/17G
17 POWDER, FOR SOLUTION ORAL DAILY
Status: DISCONTINUED | OUTPATIENT
Start: 2019-01-01 | End: 2019-01-01

## 2019-01-01 RX ORDER — NOREPINEPHRINE BITARTRATE/D5W 8 MG/250ML
0.02 PLASTIC BAG, INJECTION (ML) INTRAVENOUS CONTINUOUS
Status: DISCONTINUED | OUTPATIENT
Start: 2019-01-01 | End: 2019-01-01

## 2019-01-01 RX ORDER — INDOMETHACIN 25 MG/1
CAPSULE ORAL
Status: DISCONTINUED
Start: 2019-01-01 | End: 2019-01-01 | Stop reason: HOSPADM

## 2019-01-01 RX ORDER — POTASSIUM CHLORIDE 14.9 MG/ML
60 INJECTION INTRAVENOUS
Status: DISCONTINUED | OUTPATIENT
Start: 2019-01-01 | End: 2019-01-01

## 2019-01-01 RX ORDER — METOLAZONE 5 MG/1
5 TABLET ORAL DAILY
Status: ON HOLD | COMMUNITY
End: 2019-01-01 | Stop reason: HOSPADM

## 2019-01-01 RX ORDER — SODIUM CHLORIDE 9 MG/ML
1000 INJECTION, SOLUTION INTRAVENOUS
Status: COMPLETED | OUTPATIENT
Start: 2019-01-01 | End: 2019-01-01

## 2019-01-01 RX ORDER — IBUPROFEN 200 MG
24 TABLET ORAL
Status: DISCONTINUED | OUTPATIENT
Start: 2019-01-01 | End: 2019-01-01

## 2019-01-01 RX ORDER — IBUPROFEN 200 MG
16 TABLET ORAL
Status: DISCONTINUED | OUTPATIENT
Start: 2019-01-01 | End: 2019-01-01

## 2019-01-01 RX ORDER — MAGNESIUM SULFATE HEPTAHYDRATE 40 MG/ML
4 INJECTION, SOLUTION INTRAVENOUS
Status: DISCONTINUED | OUTPATIENT
Start: 2019-01-01 | End: 2019-01-01

## 2019-01-01 RX ORDER — ALLOPURINOL 300 MG/1
300 TABLET ORAL DAILY
Status: ON HOLD | COMMUNITY
End: 2019-01-01 | Stop reason: HOSPADM

## 2019-01-01 RX ORDER — INSULIN ASPART 100 [IU]/ML
0-5 INJECTION, SOLUTION INTRAVENOUS; SUBCUTANEOUS EVERY 6 HOURS PRN
Status: DISCONTINUED | OUTPATIENT
Start: 2019-01-01 | End: 2019-01-01

## 2019-01-01 RX ORDER — METFORMIN HYDROCHLORIDE 500 MG/1
500 TABLET ORAL 2 TIMES DAILY WITH MEALS
Status: ON HOLD | COMMUNITY
End: 2019-01-01 | Stop reason: HOSPADM

## 2019-01-01 RX ORDER — NOREPINEPHRINE BITARTRATE/D5W 4MG/250ML
0.02 PLASTIC BAG, INJECTION (ML) INTRAVENOUS CONTINUOUS
Status: DISCONTINUED | OUTPATIENT
Start: 2019-01-01 | End: 2019-01-01

## 2019-01-01 RX ORDER — ONDANSETRON 2 MG/ML
4 INJECTION INTRAMUSCULAR; INTRAVENOUS EVERY 8 HOURS PRN
Status: DISCONTINUED | OUTPATIENT
Start: 2019-01-01 | End: 2019-01-01 | Stop reason: HOSPADM

## 2019-01-01 RX ORDER — BUSPIRONE HYDROCHLORIDE 10 MG/1
30 TABLET ORAL ONCE
Status: COMPLETED | OUTPATIENT
Start: 2019-01-01 | End: 2019-01-01

## 2019-01-01 RX ORDER — SODIUM CHLORIDE 9 MG/ML
INJECTION, SOLUTION INTRAVENOUS CONTINUOUS
Status: DISCONTINUED | OUTPATIENT
Start: 2019-01-01 | End: 2019-01-01

## 2019-01-01 RX ORDER — ZOLPIDEM TARTRATE 10 MG/1
5 TABLET ORAL NIGHTLY PRN
Status: ON HOLD | COMMUNITY
End: 2019-01-01 | Stop reason: HOSPADM

## 2019-01-01 RX ORDER — COLCHICINE 0.6 MG/1
0.6 TABLET ORAL DAILY
Status: ON HOLD | COMMUNITY
End: 2019-01-01 | Stop reason: HOSPADM

## 2019-01-01 RX ADMIN — PIPERACILLIN AND TAZOBACTAM 4.5 G: 4; .5 INJECTION, POWDER, LYOPHILIZED, FOR SOLUTION INTRAVENOUS; PARENTERAL at 01:05

## 2019-01-01 RX ADMIN — NOREPINEPHRINE BITARTRATE 3 MCG/KG/MIN: 1 INJECTION, SOLUTION, CONCENTRATE INTRAVENOUS at 06:05

## 2019-01-01 RX ADMIN — DEXTROSE MONOHYDRATE 12.5 G: 25 INJECTION, SOLUTION INTRAVENOUS at 05:05

## 2019-01-01 RX ADMIN — NOREPINEPHRINE BITARTRATE 0.2 MCG/KG/MIN: 8 SOLUTION at 05:05

## 2019-01-01 RX ADMIN — HEPARIN SODIUM AND DEXTROSE 250 UNITS/HR: 10000; 5 INJECTION INTRAVENOUS at 08:05

## 2019-01-01 RX ADMIN — IPRATROPIUM BROMIDE AND ALBUTEROL SULFATE 3 ML: .5; 3 SOLUTION RESPIRATORY (INHALATION) at 07:05

## 2019-01-01 RX ADMIN — CALCIUM GLUCONATE 1000 MG: 94 INJECTION, SOLUTION INTRAVENOUS at 08:05

## 2019-01-01 RX ADMIN — NOREPINEPHRINE BITARTRATE 0.02 MCG/KG/MIN: 1 INJECTION, SOLUTION, CONCENTRATE INTRAVENOUS at 10:05

## 2019-01-01 RX ADMIN — BUSPIRONE HYDROCHLORIDE 30 MG: 10 TABLET ORAL at 09:05

## 2019-01-01 RX ADMIN — HYDROCORTISONE SODIUM SUCCINATE 100 MG: 100 INJECTION, POWDER, FOR SOLUTION INTRAMUSCULAR; INTRAVENOUS at 06:05

## 2019-01-01 RX ADMIN — VANCOMYCIN HYDROCHLORIDE 2500 MG: 1 INJECTION, POWDER, FOR SOLUTION INTRAVENOUS at 02:05

## 2019-01-01 RX ADMIN — VASOPRESSIN 0.04 UNITS/MIN: 20 INJECTION INTRAVENOUS at 10:05

## 2019-01-01 RX ADMIN — Medication: at 01:05

## 2019-01-01 RX ADMIN — FAMOTIDINE 20 MG: 10 INJECTION, SOLUTION INTRAVENOUS at 09:05

## 2019-01-01 RX ADMIN — SODIUM CHLORIDE 638 ML: 9 INJECTION, SOLUTION INTRAVENOUS at 04:05

## 2019-01-01 RX ADMIN — MAGNESIUM SULFATE 2 G: 2 INJECTION INTRAVENOUS at 07:05

## 2019-01-01 RX ADMIN — PIPERACILLIN AND TAZOBACTAM 4.5 G: 4; .5 INJECTION, POWDER, LYOPHILIZED, FOR SOLUTION INTRAVENOUS; PARENTERAL at 05:05

## 2019-01-01 RX ADMIN — CHLORHEXIDINE GLUCONATE 0.12% ORAL RINSE 15 ML: 1.2 LIQUID ORAL at 09:05

## 2019-01-01 RX ADMIN — SODIUM BICARBONATE 50 MEQ: 84 INJECTION, SOLUTION INTRAVENOUS at 06:05

## 2019-01-01 RX ADMIN — NOREPINEPHRINE BITARTRATE 3 MCG/KG/MIN: 1 INJECTION, SOLUTION, CONCENTRATE INTRAVENOUS at 08:05

## 2019-01-01 RX ADMIN — VASOPRESSIN 0.04 UNITS/MIN: 20 INJECTION INTRAVENOUS at 06:05

## 2019-01-01 RX ADMIN — MINERAL OIL AND PETROLATUM: 150; 830 OINTMENT OPHTHALMIC at 07:05

## 2019-01-01 RX ADMIN — FUROSEMIDE 80 MG: 10 INJECTION, SOLUTION INTRAMUSCULAR; INTRAVENOUS at 01:05

## 2019-01-01 RX ADMIN — MAGNESIUM SULFATE 2 G: 2 INJECTION INTRAVENOUS at 02:05

## 2019-01-01 RX ADMIN — DEXTROSE MONOHYDRATE 12.5 G: 25 INJECTION, SOLUTION INTRAVENOUS at 03:05

## 2019-01-01 RX ADMIN — NOREPINEPHRINE BITARTRATE 0.02 MCG/KG/MIN: 4 SOLUTION at 02:05

## 2019-01-01 RX ADMIN — NOREPINEPHRINE BITARTRATE 0.2 MCG/KG/MIN: 8 SOLUTION at 02:05

## 2019-01-01 RX ADMIN — DOPAMINE HYDROCHLORIDE 5 MCG/KG/MIN: 160 INJECTION, SOLUTION INTRAVENOUS at 06:05

## 2019-01-01 RX ADMIN — HEPARIN SODIUM AND DEXTROSE 18 UNITS/KG/HR: 10000; 5 INJECTION INTRAVENOUS at 06:05

## 2019-01-01 RX ADMIN — SODIUM CHLORIDE 1800 ML: 0.9 INJECTION, SOLUTION INTRAVENOUS at 02:05

## 2019-01-01 RX ADMIN — LEVETIRACETAM INJECTION 500 MG: 5 INJECTION INTRAVENOUS at 07:05

## 2019-01-01 RX ADMIN — NOREPINEPHRINE BITARTRATE 3 MCG/KG/MIN: 8 SOLUTION at 06:05

## 2019-01-01 RX ADMIN — SODIUM CHLORIDE 1000 ML: 900 INJECTION, SOLUTION INTRAVENOUS at 12:05

## 2019-01-01 RX ADMIN — PIPERACILLIN AND TAZOBACTAM 4.5 G: 4; .5 INJECTION, POWDER, LYOPHILIZED, FOR SOLUTION INTRAVENOUS; PARENTERAL at 09:05

## 2019-01-01 RX ADMIN — SODIUM BICARBONATE 50 MEQ: 84 INJECTION, SOLUTION INTRAVENOUS at 02:05

## 2019-01-01 RX ADMIN — SODIUM BICARBONATE: 84 INJECTION, SOLUTION INTRAVENOUS at 06:05

## 2019-01-01 RX ADMIN — LEVETIRACETAM INJECTION 500 MG: 5 INJECTION INTRAVENOUS at 09:05

## 2019-05-22 PROBLEM — R91.8 PULMONARY INFILTRATES ON CXR: Status: ACTIVE | Noted: 2019-01-01

## 2019-05-22 PROBLEM — K59.09 CHRONIC CONSTIPATION: Chronic | Status: ACTIVE | Noted: 2019-01-01

## 2019-05-22 PROBLEM — G40.909 SEIZURE DISORDER: Chronic | Status: ACTIVE | Noted: 2019-01-01

## 2019-05-22 PROBLEM — G93.1 HYPOXIC ENCEPHALOPATHY: Status: ACTIVE | Noted: 2019-01-01

## 2019-05-22 PROBLEM — G89.29 CHRONIC MIDLINE LOW BACK PAIN: Chronic | Status: ACTIVE | Noted: 2019-01-01

## 2019-05-22 PROBLEM — J96.02 ACUTE RESPIRATORY FAILURE WITH HYPOXIA AND HYPERCARBIA: Status: ACTIVE | Noted: 2019-01-01

## 2019-05-22 PROBLEM — Z86.711 HISTORY OF PULMONARY EMBOLISM: Chronic | Status: ACTIVE | Noted: 2019-01-01

## 2019-05-22 PROBLEM — N17.9 AKI (ACUTE KIDNEY INJURY): Status: ACTIVE | Noted: 2019-01-01

## 2019-05-22 PROBLEM — I46.9 CARDIOPULMONARY ARREST WITH SUCCESSFUL RESUSCITATION: Status: ACTIVE | Noted: 2019-01-01

## 2019-05-22 PROBLEM — E87.29 METABOLIC ACIDOSIS, INCREASED ANION GAP (IAG): Status: ACTIVE | Noted: 2019-01-01

## 2019-05-22 PROBLEM — E11.649 TYPE 2 DIABETES MELLITUS WITH HYPOGLYCEMIA, WITHOUT LONG-TERM CURRENT USE OF INSULIN: Status: ACTIVE | Noted: 2019-01-01

## 2019-05-22 PROBLEM — J96.01 ACUTE RESPIRATORY FAILURE WITH HYPOXIA AND HYPERCARBIA: Status: ACTIVE | Noted: 2019-01-01

## 2019-05-22 PROBLEM — K72.00 SHOCK LIVER: Status: ACTIVE | Noted: 2019-01-01

## 2019-05-22 PROBLEM — Z95.2 HX OF REPLACEMENT OF AORTIC VALVE: Chronic | Status: ACTIVE | Noted: 2019-01-01

## 2019-05-22 PROBLEM — E87.20 METABOLIC ACIDOSIS: Status: ACTIVE | Noted: 2019-01-01

## 2019-05-22 PROBLEM — M54.50 CHRONIC MIDLINE LOW BACK PAIN: Chronic | Status: ACTIVE | Noted: 2019-01-01

## 2019-05-22 PROBLEM — I50.23 ACUTE ON CHRONIC SYSTOLIC HEART FAILURE: Status: ACTIVE | Noted: 2019-01-01

## 2019-05-22 NOTE — ASSESSMENT & PLAN NOTE
Hx of Aortic and Tricuspid valve replacement in 2012 per daughter  Starting Heparin infusion  Echo pending

## 2019-05-22 NOTE — ASSESSMENT & PLAN NOTE
Has cardiac arrest at home  Significant h/o of multiple cardiac issues  Severe systolic CHF  Pulmonary HTN  H/o of PE  CAD  Noted young age

## 2019-05-22 NOTE — ASSESSMENT & PLAN NOTE
Severe metabolic acidosis with life threatening level  Noted acidemia has worsened since admit  Lactic acidosis  Hypotension  Cardiogenic shock  Acute kidney injury  Will require urgent dialysis  Will not tolerate conventional dialysis  Will provide CRRT. Discussed with HD nurse  Orders placed in epic

## 2019-05-22 NOTE — ASSESSMENT & PLAN NOTE
Cont full vent support  VAP prophylaxis  Vent settings reviewed and adjusted multiple times  ICU Pulm Monitoring and support

## 2019-05-22 NOTE — ASSESSMENT & PLAN NOTE
Review of pharmacy meds reveal she has not been filling her Lasix, Spirinolactone and Metolazone meds at home pharmacy  Due to shock unable to diurese aggresively  Will start CRRT

## 2019-05-22 NOTE — HOSPITAL COURSE
5/22 - Admitted to ICU intubated on mechanical ventilation unresponsive not on sedation but requiring Levophed infusion.  Cyanosis of distal extremities noted.  CT head not done prior to arrival to ICU.   5/23 - continued decline overnight despite CRRT; on pressor x 3 on mechanical ventilation, pH down to 6.8 despite RRT and bicarb therapy

## 2019-05-22 NOTE — SUBJECTIVE & OBJECTIVE
Past Medical History:   Diagnosis Date    CHF (congestive heart failure)     Chronic back pain     Constipation due to pain medication     Diabetes mellitus     Epilepsy     MAICOL (generalized anxiety disorder)     Gout     Pulmonary embolus        Past Surgical History:   Procedure Laterality Date    BRAIN SURGERY      Doubel heart valve replacement      JOINT REPLACEMENT      lumbar back surgery 2001      TOTAL KNEE ARTHROPLASTY      Left       Review of patient's allergies indicates:   Allergen Reactions    Acetaminophen Swelling       Family History     Problem Relation (Age of Onset)    Hypertension Mother        Tobacco Use    Smoking status: Former Smoker   Substance and Sexual Activity    Alcohol use: Not Currently     Frequency: Never    Drug use: Never    Sexual activity: Not Currently         Review of Systems   Unable to perform ROS: Intubated     Objective:     Vital Signs (Most Recent):  Temp: 99 °F (37.2 °C) (05/22/19 1320)  Pulse: 105 (05/22/19 1643)  Resp: (!) 30 (05/22/19 1643)  BP: 99/67 (05/22/19 1619)  SpO2: (!) 81 % (05/22/19 1643) Vital Signs (24h Range):  Temp:  [99 °F (37.2 °C)] 99 °F (37.2 °C)  Pulse:  [] 105  Resp:  [16-37] 30  SpO2:  [59 %-98 %] 81 %  BP: ()/(35-89) 99/67     Weight: 89.6 kg (197 lb 9.6 oz)  Body mass index is 31.89 kg/m².      Intake/Output Summary (Last 24 hours) at 5/22/2019 1707  Last data filed at 5/22/2019 1417  Gross per 24 hour   Intake 750 ml   Output --   Net 750 ml       Physical Exam   Constitutional: She appears well-developed and well-nourished. She has a sickly appearance. She appears ill. No distress. She is intubated.   HENT:   Head: Normocephalic and atraumatic.   Mouth/Throat: Oropharynx is clear and moist and mucous membranes are normal.   Eyes: Lids are normal. Right pupil is reactive. Left pupil is not round. Left pupil is reactive.   Neck: Trachea normal. Neck supple. Carotid bruit is not present.       Cardiovascular:  Normal rate, regular rhythm and normal heart sounds.   Pulses:       Radial pulses are 1+ on the right side, and 1+ on the left side.        Dorsalis pedis pulses are 0 on the right side, and 0 on the left side.        Posterior tibial pulses are Detected w/ doppler on the right side, and Detected w/ doppler on the left side.   Pulmonary/Chest: Effort normal. No accessory muscle usage. She is intubated. No respiratory distress. She has decreased breath sounds. She has rales in the left upper field, the left middle field and the left lower field.   Abdominal: Soft. She exhibits no distension. Bowel sounds are absent. There is no tenderness.   Obese    Genitourinary:   Genitourinary Comments: Russell in place   Musculoskeletal:        Right foot: There is no deformity.        Left foot: There is no deformity.   No edema   Lymphadenopathy:     She has no cervical adenopathy.   Neurological: She is unresponsive.   No gag, corneal or carini reflex.  No withdrawal to pain and no spont or purposeful movements.  Eyes closed not opening to pain.     Skin: Skin is dry and intact. Capillary refill takes more than 3 seconds. No rash noted. There is cyanosis (distal LEs).            Vents:  Vent Mode: A/C (05/22/19 1643)  Ventilator Initiated: Yes (05/22/19 1342)  Set Rate: 30 bmp (05/22/19 1643)  Vt Set: 350 mL (05/22/19 1643)  Pressure Support: 0 cmH20 (05/22/19 1643)  PEEP/CPAP: 12 cmH20 (05/22/19 1643)  Oxygen Concentration (%): 100 (05/22/19 1643)  Peak Airway Pressure: 34 cmH2O (05/22/19 1643)  Plateau Pressure: 0 cmH20 (05/22/19 1643)  Total Ve: 11.3 mL (05/22/19 1643)  F/VT Ratio<105 (RSBI): (!) 71.6 (05/22/19 1643)    Lines/Drains/Airways     Central Venous Catheter Line                 Percutaneous Central Line Insertion/Assessment - triple lumen  05/22/19 1540 left internal jugular less than 1 day          Drain                 NG/OG Tube 05/22/19 1248 14 Fr. Right mouth less than 1 day         Urethral Catheter  05/22/19 1247 Latex 16 Fr. less than 1 day          Airway                 Airway - Non-Surgical 05/22/19 1239 Endotracheal Tube less than 1 day          Peripheral Intravenous Line                 Peripheral IV - Single Lumen 05/22/19 1240 20 G Left Wrist less than 1 day         Peripheral IV - Single Lumen 05/22/19 1243 20 G Right Antecubital less than 1 day                Significant Labs:    CBC/Anemia Profile:  Recent Labs   Lab 05/22/19  1255 05/22/19  1328   WBC 11.39  --    HGB 14.2  --    HCT 47.5 45     --    *  --    RDW 15.8*  --         Chemistries:  Recent Labs   Lab 05/22/19  1255   *   K 4.0   CL 87*   CO2 8*   BUN 19   CREATININE 2.1*   CALCIUM 9.1   ALBUMIN 3.2*   PROT 7.1   BILITOT 1.4*   ALKPHOS 234*   *   *   MG 1.6       ABGs:   Recent Labs   Lab 05/22/19  1445   PH 6.918*   PCO2 45.0   HCO3 9.2*   POCSATURATED 97   BE -23     Coagulation:   Recent Labs   Lab 05/22/19  1255   INR 1.8*   APTT 53.5*     Lactic Acid:   Recent Labs   Lab 05/22/19  1255 05/22/19  1528   LACTATE >12.0* >12.0*     Troponin:   Recent Labs   Lab 05/22/19  1255   TROPONINI 0.071*     Urine Studies:   Recent Labs   Lab 05/22/19  1253   COLORU Yellow   APPEARANCEUA Clear   PHUR 6.0   SPECGRAV >=1.030*   PROTEINUA 1+*   GLUCUA Negative   KETONESU Trace*   BILIRUBINUA Negative   OCCULTUA Negative   NITRITE Negative   UROBILINOGEN 4.0-6.0*   LEUKOCYTESUR Negative   RBCUA 8*   WBCUA 1   BACTERIA Moderate*   HYALINECASTS 0     All pertinent labs within the past 24 hours have been reviewed.    Significant Imaging:   CXR: I have reviewed all pertinent results/findings within the past 24 hours and my personal findings are:  diffuse patchy and interstitial infiltrates with pleural effusions

## 2019-05-22 NOTE — CODE/ RAPID DOCUMENTATION
EMS was called to pts home for c/o respiratory distress, pt was pale, in respiratory distress with labored breathing and diaphoretic on scene, original blood sugar was 24, amp of D50 given on scene, repeat was 200, pt was not tolerating CPAP on scene, RSI was performed with Roccuronium and ketamine, pt intubated with 7 tube, after intubation patient O2 sats improved but EMS reports pt became bradycardic then into PEA and lost pulse. Given 2 rounds of epi in route. Hx of CHF.

## 2019-05-22 NOTE — PROGRESS NOTES
Pt brought up from ED with Fentanyl gtt hanging but not infusing.  Pt is unresponsive, fentanyl gtt not needed.  235ml of Fentanyl 2500mcg/250ml wasted with Aristides RAMIREZ.  Unable to waste in pyxis d/t med not pulled from icu pyxis.

## 2019-05-22 NOTE — HPI
Pt was seen and examined in ICU. H/o and chart reviewed. Pt is a 57 y/o female s/p cardiac arrest. Pt is critically ill with cardiogenic shock and severe metabolic acidosis (PH 6.9). Pt has signifiacnt cardiac issues including severe systolic CHF with EF 20% and h/o of AVR. Admit notes reviewed. Noted pt was hypoxic and bradycardic in the filed and developed PEA. Labs, meds, mgmt reviewed in details.

## 2019-05-22 NOTE — HPI
"Ms Ascencio is a 57 yo female with a PMH of CHF on diuretics at home, Seizure D/O, PE, chronic lumbar back pain on chronic narcotics,  Chronic constipation due to narcotics, MAICOL, DM, Gastroparesis, Gout and "double heart valve surgery".  According to records, Ms Ascencio was at home and called EMS due to acute onset of SOB.  EMS arrived to find patient in resp distress, diaphoretic, RA SAT 70%, glucose 24.  D50 IVP given and attempted CPAP.  CPAP unsuccessful and reportedly intubated on scene.  Patient had Bradycardia to PEA and ACLS started.  Received Epi X 2 but no Defib and arrived to [facility] ED with CPR in progress.  First pulse check in ED ROSC attained.  CL placed in ED and according to ED records re-intubated.  IV Lasix given then due to elevated LA > 12 given IVF bolus for sepsis protocol.  In ED serum CO2 8, creatine 2.1 (last creatine 1.3), AG 36, INR 1.8, Trop 0.07, BNP 2918, ABG 6.92/54/85/11 and CXR with diffuse patchy infiltrates and pleural effusions.  Daughter reported in ED patient stopped taking her diuretics 1 week ago due to severe gout.  She follows with Dr. Smith for Cards and at times has to get IV Lasix.  Daughter reports she has IVC filter from her previous PE but is not on home anticoagulants.  Also, she has been having increased weight gain, JUAREZ and chest congestion over last few days but no fever.  Her previous valve surgery was Aortic and Tricuspid done in 2012.  "

## 2019-05-22 NOTE — PROGRESS NOTES
Pharmacist Renal Dose Adjustment Note    Gurvinder Rankin is a 58 y.o. female being treated with the medication Zosyn for pneumonia.     Patient Data:    Vital Signs (Most Recent):  Temp: 99 °F (37.2 °C) (05/22/19 1320)  Pulse: 105 (05/22/19 1643)  Resp: (!) 30 (05/22/19 1643)  BP: 99/67 (05/22/19 1619)  SpO2: (!) 81 % (05/22/19 1643)   Vital Signs (72h Range):  Temp:  [99 °F (37.2 °C)]   Pulse:  []   Resp:  [16-37]   BP: ()/(35-89)   SpO2:  [59 %-98 %]      Recent Labs   Lab 05/22/19  1255   CREATININE 2.1*     Serum creatinine: 2.1 mg/dL (H) 05/22/19 1255  Estimated creatinine clearance: 33.7 mL/min (A)    Zosyn 4.5 gm IV every 12 hours (over 30 min) will be changed to 4.5 gm IV every 8 hours (over 4 hours) per protocol for CrCl > 20 ml/min. If CrCl worsens & drops < 20 ml/min, we will change frequency to every 12 hours.     Pharmacist's Name: Katherine E Mcardle  Pharmacist's Extension: [phone]

## 2019-05-22 NOTE — PROGRESS NOTES
Patient arrived to the ED via EMS intubated upon arrival, CPR in progress. RT assisted with bag ETT ventilation, and placed on mechanical ventilator, setting verified by Dr. Camacho.

## 2019-05-22 NOTE — ED NOTES
Automatic blood pressure unable to read. Unable to ascultate Manual blood pressure. Dr. Camacho to bedside. Ultrasound Manual BP 62 systolic. Fentanyl drip paused at this time. Orders received for levophed to be started peripherally and to set up for central line placement.

## 2019-05-22 NOTE — ASSESSMENT & PLAN NOTE
Reportedly did receive some Rocuronium for unknown reason may be cause of comatose state  Due to Cardiac arrest will start TTM if CT head unremarkable for ICH  ICU neuro monitoring

## 2019-05-22 NOTE — ED NOTES
Daughter reports that patient has stopped taking her home diuretic for about 1 week s/t painful swelling feet and she was unable to walk to bathroom. Dr. Camacho talking with daughter about POC.

## 2019-05-22 NOTE — PROGRESS NOTES
Repeat 68.9, E Anand NP made aware and stated to hold the heparin and recheck PTT in 6 hours.  If less than 39 restart heparin at ordered rate and monitor per orders.  Night nurse made aware.

## 2019-05-22 NOTE — HPI
Patient is a 59 yo female presenting to ED s/p Cardiac Arrest with ROSC. Patient found to be hypoxic, and hypoglycemic at the scene by EMS. BS 24 O2 sats in 70's. Patient was intubated in the field. Patient bradycardic resulting in PEA. CPR initiated with a ROSC. Patient initiated on pressors, central line placed. LA >12, 30 mg /kg fluids administered. Repeat LA remained > 12. Re-perfussion exam performed. Patient admitted to ICU.

## 2019-05-22 NOTE — PROCEDURES
"Gurvinder Rankin is a 58 y.o. female patient.    Temp: 99 °F (37.2 °C) (05/22/19 1320)  Pulse: 78 (05/22/19 1710)  Resp: (!) 30 (05/22/19 1710)  BP: 99/67 (05/22/19 1619)  SpO2: (!) 89 % (05/22/19 1710)  Weight: 89.6 kg (197 lb 9.6 oz) (05/22/19 1300)  Height: 5' 6" (167.6 cm) (05/22/19 1300)       Arterial Line  Date/Time: 5/22/2019 4:45 PM  Location procedure was performed: [facility] INTENSIVE CARE UNIT  Performed by: Theodore Michel NP  Authorized by: Theodore Michel NP   Pre-op Diagnosis: shock  Post-operative diagnosis: shock  Consent Done: Not Needed  Preparation: Patient was prepped and draped in the usual sterile fashion.  Indications: multiple ABGs, respiratory failure and hemodynamic monitoring  Location: right radial  Patient sedated: no  Needle gauge: 20  Seldinger technique: Seldinger technique used  Number of attempts: 1  Complications: No  Estimated blood loss (mL): 1  Specimens: No  Implants: No  Post-procedure: line sutured and dressing applied  Post-procedure CMS: unchanged  Patient tolerance: Patient tolerated the procedure well with no immediate complications          Theodore Michel  5/22/2019  "

## 2019-05-22 NOTE — SUBJECTIVE & OBJECTIVE
Past Medical History:   Diagnosis Date    CHF (congestive heart failure)     Chronic back pain     Constipation due to pain medication     Diabetes mellitus     Epilepsy     MAICOL (generalized anxiety disorder)     Gout     Pulmonary embolus        Past Surgical History:   Procedure Laterality Date    BRAIN SURGERY      Doubel heart valve replacement      JOINT REPLACEMENT      lumbar back surgery 2001      TOTAL KNEE ARTHROPLASTY      Left       Review of patient's allergies indicates:   Allergen Reactions    Acetaminophen Swelling     Current Facility-Administered Medications   Medication Frequency    0.9%  NaCl infusion (CRRT USE ONLY) Continuous    albuterol-ipratropium 2.5 mg-0.5 mg/3 mL nebulizer solution 3 mL Q6H WAKE    bisacodyl suppository 10 mg Daily PRN    chlorhexidine 0.12 % solution 15 mL BID    dextrose 50% injection 12.5 g PRN    [START ON 5/23/2019] famotidine (PF) injection 20 mg Daily    glucagon (human recombinant) injection 1 mg PRN    glucose chewable tablet 16 g PRN    glucose chewable tablet 24 g PRN    heparin 25,000 units in dextrose 5% (100 units/ml) IV bolus from bag - ADDITIONAL PRN BOLUS - 30 units/kg PRN    heparin 25,000 units in dextrose 5% (100 units/ml) IV bolus from bag - ADDITIONAL PRN BOLUS - 60 units/kg PRN    heparin 25,000 units in dextrose 5% 250 mL (100 units/mL) infusion HIGH INTENSITY nomogram - [facility] Continuous    ibuprofen tablet 400 mg Q6H PRN    insulin aspart U-100 pen 0-5 Units Q6H PRN    levETIRAcetam in NaCl (iso-os) IVPB 500 mg Q12H    magnesium sulfate 2g in water 50mL IVPB (premix) Once    magnesium sulfate 2g in water 50mL IVPB (premix) PRN    NORepinephrine bitartrate 8 mg in dextrose 5% 250 mL infusion Continuous    ondansetron injection 4 mg Q8H PRN    piperacillin-tazobactam 4.5 g in dextrose 5 % 100 mL IVPB (ready to mix system) Q8H    [START ON 5/23/2019] polyethylene glycol packet 17 g Daily    sodium chloride 0.9% flush  10 mL PRN    sodium phosphate 20.01 mmol in dextrose 5 % 250 mL IVPB PRN    sodium phosphate 30 mmol in dextrose 5 % 250 mL IVPB PRN    sodium phosphate 39.99 mmol in dextrose 5 % 250 mL IVPB PRN     Family History     Problem Relation (Age of Onset)    Hypertension Mother        Tobacco Use    Smoking status: Former Smoker   Substance and Sexual Activity    Alcohol use: Not Currently     Frequency: Never    Drug use: Never    Sexual activity: Not Currently     Review of Systems   Unable to perform ROS: Intubated     Objective:     Vital Signs (Most Recent):  Temp: 97.6 °F (36.4 °C) (05/22/19 1645)  Pulse: 77 (05/22/19 1745)  Resp: (!) 30 (05/22/19 1745)  BP: (!) 77/44 (05/22/19 1745)  SpO2: (!) 89 % (05/22/19 1710)  O2 Device (Oxygen Therapy): ventilator (05/22/19 1710) Vital Signs (24h Range):  Temp:  [97.6 °F (36.4 °C)-99 °F (37.2 °C)] 97.6 °F (36.4 °C)  Pulse:  [] 77  Resp:  [16-37] 30  SpO2:  [59 %-98 %] 89 %  BP: ()/(35-89) 77/44     Weight: 89.6 kg (197 lb 9.6 oz) (05/22/19 1300)  Body mass index is 31.89 kg/m².  Body surface area is 2.04 meters squared.    No intake/output data recorded.    Physical Exam   Constitutional: She appears well-developed and well-nourished.   HENT:   Head: Normocephalic and atraumatic.   Neck: No JVD present.   Cardiovascular: Normal rate and regular rhythm. Exam reveals no friction rub.   Pulmonary/Chest: She has rales.   intubated   Abdominal: Soft.   Musculoskeletal: She exhibits no edema.   Skin: Skin is warm.   Nursing note and vitals reviewed.      Significant Labs: reviewed  BMP  Lab Results   Component Value Date     (L) 05/22/2019    K 4.0 05/22/2019    CL 87 (L) 05/22/2019    CO2 8 (LL) 05/22/2019    BUN 19 05/22/2019    CREATININE 2.1 (H) 05/22/2019    CALCIUM 9.1 05/22/2019    ANIONGAP 36 (H) 05/22/2019    ESTGFRAFRICA 30 (A) 05/22/2019    EGFRNONAA 26 (A) 05/22/2019     Lab Results   Component Value Date    WBC 11.39 05/22/2019    HGB 14.2  05/22/2019    HCT 45 05/22/2019     (H) 05/22/2019     05/22/2019     ABG  Recent Labs   Lab 05/22/19  1713   PH 6.912*   PO2 89   PCO2 42.7   HCO3 8.6*   BE -24     Recent Labs   Lab 05/22/19  1722   TROPONINI 0.436*         Significant Imaging: reviewed

## 2019-05-22 NOTE — ED NOTES
Respiratory states that patient 02 saturation is 97% by ABG. Nasal pulse ox moved to ear at this time. )2 sat 97%.     Telephone Encounter by Tete Gleason CMA at 03/29/18 10:57 AM     Author:  Tete Gleason CMA Service:  (none) Author Type:  Certified Medical Assistant     Filed:  03/29/18 10:57 AM Encounter Date:  3/29/2018 Status:  Signed     :  Tete Gleason CMA (Certified Medical Assistant)            Patient notified.[CR1.1T]        Revision History        User Key Date/Time User Provider Type Action    > CR1.1 03/29/18 10:57 AM Tete Gleason CMA Certified Medical Assistant Sign    T - Template

## 2019-05-22 NOTE — ASSESSMENT & PLAN NOTE
Possible cause of cardiac arrest  Daughter reports patient has IVC filter  Check LE doppler US and cardiac Echo  Start Heparin infusion  If Echo shows RV strain will need to consider CTA chest to rule out PE even given SHEREE

## 2019-05-22 NOTE — H&P
[facility] Hospital Medicine  History & Physical    Patient Name: Gurvinder Rankin  MRN: [id number]  Admission Date: 5/22/2019  Attending Physician: Aristides Curry MD  Primary Care Provider: Provider [employer]         Patient information was obtained from EMS personnel and ER records.     Subjective:     Principal Problem:Cardiopulmonary arrest with successful resuscitation    Chief Complaint:   Chief Complaint   Patient presents with    Cardiac Arrest     pt arrives in cardiac arrest, CPR in progress by EMS        HPI: Patient is a 59 yo female presenting to ED s/p Cardiac Arrest with ROSC. Patient found to be hypoxic, and hypoglycemic at the scene by EMS. BS 24 O2 sats in 70's. Patient was intubated in the field. Patient bradycardic resulting in PEA. CPR initiated with a ROSC. Patient initiated on pressors, central line placed. LA >12, 30 mg /kg fluids administered. Repeat LA remained > 12. Re-perfussion exam performed. Patient admitted to ICU.      Past Medical History:   Diagnosis Date    CHF (congestive heart failure)     Chronic back pain     Constipation due to pain medication     Diabetes mellitus     Epilepsy     MAICOL (generalized anxiety disorder)     Gout     Pulmonary embolus        Past Surgical History:   Procedure Laterality Date    BRAIN SURGERY      Doubel heart valve replacement      JOINT REPLACEMENT      lumbar back surgery 2001      TOTAL KNEE ARTHROPLASTY      Left       Review of patient's allergies indicates:   Allergen Reactions    Acetaminophen Swelling       No current facility-administered medications on file prior to encounter.      No current outpatient medications on file prior to encounter.     Family History     Problem Relation (Age of Onset)    Hypertension Mother        Tobacco Use    Smoking status: Former Smoker   Substance and Sexual Activity    Alcohol use: Not Currently     Frequency: Never    Drug use: Never    Sexual activity: Not Currently      Review of Systems   Unable to perform ROS: Intubated     Objective:     Vital Signs (Most Recent):  Temp: 99 °F (37.2 °C) (05/22/19 1320)  Pulse: 105 (05/22/19 1643)  Resp: (!) 30 (05/22/19 1643)  BP: 99/67 (05/22/19 1619)  SpO2: (!) (P) 81 % (05/22/19 1643) Vital Signs (24h Range):  Temp:  [99 °F (37.2 °C)] 99 °F (37.2 °C)  Pulse:  [] 105  Resp:  [16-37] 30  SpO2:  [59 %-98 %] (P) 81 %  BP: ()/(35-89) 99/67     Weight: 89.6 kg (197 lb 9.6 oz)  Body mass index is 31.89 kg/m².    Physical Exam   Constitutional: She appears well-developed and well-nourished. She is intubated.   HENT:   Head: Normocephalic and atraumatic.   Eyes: Pupils are equal, round, and reactive to light. EOM are normal.   Neck: Normal range of motion. Neck supple. No JVD present.   Cardiovascular: Regular rhythm and intact distal pulses. Tachycardia present.   Murmur heard.   Systolic murmur is present with a grade of 3/6.  Pulmonary/Chest: Effort normal. She is intubated. No respiratory distress. She has decreased breath sounds. She has wheezes in the right middle field, the right lower field and the left middle field.   Abdominal: Soft. Bowel sounds are normal. She exhibits no distension.   Musculoskeletal: Normal range of motion. She exhibits no edema or tenderness.   Neurological: She has normal reflexes. She is unresponsive. No cranial nerve deficit.   Skin: Skin is warm, dry and intact. Capillary refill takes more than 3 seconds. No erythema. There is pallor.   Psychiatric: Cognition and memory are impaired. She is noncommunicative.   Nursing note and vitals reviewed.        CRANIAL NERVES     CN III, IV, VI   Pupils are equal, round, and reactive to light.  Extraocular motions are normal.   Right pupil: Reactivity: sluggish.   Left pupil: Reactivity: sluggish.        Significant Labs:   ABGs:   Recent Labs   Lab 05/22/19  1445   PH 6.918*   PCO2 45.0   HCO3 9.2*   POCSATURATED 97   BE -23     Blood Culture: No results for  input(s): LABBLOO in the last 48 hours.  BMP:   Recent Labs   Lab 05/22/19  1255   *   *   K 4.0   CL 87*   CO2 8*   BUN 19   CREATININE 2.1*   CALCIUM 9.1   MG 1.6     CBC:   Recent Labs   Lab 05/22/19  1255 05/22/19  1328   WBC 11.39  --    HGB 14.2  --    HCT 47.5 45     --      CMP:   Recent Labs   Lab 05/22/19  1255   *   K 4.0   CL 87*   CO2 8*   *   BUN 19   CREATININE 2.1*   CALCIUM 9.1   PROT 7.1   ALBUMIN 3.2*   BILITOT 1.4*   ALKPHOS 234*   *   *   ANIONGAP 36*   EGFRNONAA 26*     Lactic Acid:   Recent Labs   Lab 05/22/19  1255 05/22/19  1528   LACTATE >12.0* >12.0*     Lipase: No results for input(s): LIPASE in the last 48 hours.  Troponin:   Recent Labs   Lab 05/22/19  1255   TROPONINI 0.071*     Urine Studies:   Recent Labs   Lab 05/22/19  1253   COLORU Yellow   APPEARANCEUA Clear   PHUR 6.0   SPECGRAV >=1.030*   PROTEINUA 1+*   GLUCUA Negative   KETONESU Trace*   BILIRUBINUA Negative   OCCULTUA Negative   NITRITE Negative   UROBILINOGEN 4.0-6.0*   LEUKOCYTESUR Negative   RBCUA 8*   WBCUA 1   BACTERIA Moderate*   HYALINECASTS 0     All pertinent labs within the past 24 hours have been reviewed.    Significant Imaging: I have reviewed all pertinent imaging results/findings within the past 24 hours.    Assessment/Plan:     * Cardiopulmonary arrest with successful resuscitation  Cardiology  ICU  Tele Monitor      Hx of double heart valve surgery  Cardiology on Consult  OAC      Chronic midline low back pain w/ chronic narcotic use  Hold Narcotics  Assess on Recovery      Chronic constipation  Fluids  monitor      Type 2 diabetes mellitus with hypoglycemia, without long-term current use of insulin  Monitor  Accuchecks        History of pulmonary embolism  scd's  Heprain Gtt  Monitor      Seizure disorder  Monitor for seizure activity  Hold Depakote for now        Shock liver  Likely due to cardiac arrest  Monitor LFT's  IVF's      SHEREE (acute kidney  injury)    IVF's  Nephrology as indicated  Cr 2.1    Acute respiratory failure with hypoxia and hypercarbia  Intubated  No Sedation at present  Pulmonary CC  Wean as tolerated      Acute on chronic systolic heart failure  Pressors  Await 2D Echo  Cardiology  Pulmonary CC      Pulmonary infiltrates on CXR  ABX  Vanc  Zosyn  Pulmonary CC  ICU      VTE Risk Mitigation (From admission, onward)        Ordered     heparin 25,000 units in dextrose 5% 250 mL (100 units/mL) infusion HIGH INTENSITY nomogram - [facility]  Continuous      05/22/19 1722     heparin 25,000 units in dextrose 5% (100 units/ml) IV bolus from bag - ADDITIONAL PRN BOLUS - 60 units/kg  As needed (PRN)      05/22/19 1722     heparin 25,000 units in dextrose 5% (100 units/ml) IV bolus from bag - ADDITIONAL PRN BOLUS - 30 units/kg  As needed (PRN)      05/22/19 1722     IP VTE HIGH RISK PATIENT  Once      05/22/19 1701        Critical care time spent on the evaluation and treatment of severe organ dysfunction, review of pertinent labs and imaging studies, discussions with consulting providers and discussions with patient/family: 75 minutes.     Aristides Curry MD  Department of Hospital Medicine   [facility]

## 2019-05-22 NOTE — ED NOTES
Dr. Camacho at bedside. Notified of 02 sat 79-80% intubated. New orders received for Lasix. Ok to start Fentanyl drip at this time with pressure 99/68. Dr. Camacho ntoified of patient's current temp. 99.0. No orders received for targeted temperature management at this time.

## 2019-05-22 NOTE — ED PROVIDER NOTES
SCRIBE #1 NOTE: I, Deborah Romero, am scribing for, and in the presence of, Jose Camacho MD. I have scribed the entire note.       History     Chief Complaint   Patient presents with    Cardiac Arrest     pt arrives in cardiac arrest, CPR in progress by EMS     Review of patient's allergies indicates:   Allergen Reactions    Acetaminophen Swelling         History of Present Illness     HPI    5/22/2019, 12:57 PM  History obtained from EMS and chart record      History of Present Illness: Gurvinder Rankin is a 58 y.o. female patient with a PMHx CHF who presents to the Emergency Department for evaluation of cardiac arrest. Per EMS, Patient called for c/o of respiratory distress and SOB. On EMS arrival patient was in respiratory distress. Patient was diaphoretic on scene, blood sugar of 24 and sating 70% on room air. Amp of d50 given on scene. Attempted CPAP however in route to hospital, patient was reportedly doing poorly on CPAP with max sats of 84% and the decision was made by EMS to intubate. Patient was reportedly given 80 mg roccuronium, 140 mg of ketamine, and 5 mg versed, to intubate. At some point after RSI, patient lost pulses and EMS started CPR. Initial rhythm was PEA. Two rounds of Epi administered PTA. No shocks administered. EMS arrived to ER w/ CPR in progress. First pulse check in ED patient had pulse.        Arrival mode: EMS    PCP: Provider [facility]        Past Medical History:  Past Medical History:   Diagnosis Date    CHF (congestive heart failure)     Epilepsy     Pulmonary embolus        Past Surgical History:  Past Surgical History:   Procedure Laterality Date    Doubel heart valve replacement           Family History:  No family history on file.    Social History:  Social History     Tobacco Use    Smoking status: Former Smoker   Substance and Sexual Activity    Alcohol use: Not Currently     Frequency: Never    Drug use: Never    Sexual activity: Not on file        Review of  "Systems     Review of Systems   Unable to perform ROS: Acuity of condition        Physical Exam     Initial Vitals   BP Pulse Resp Temp SpO2   05/22/19 1247 05/22/19 1243 05/22/19 1251 05/22/19 1320 05/22/19 1251   129/67 (!) 117 16 99 °F (37.2 °C) 95 %      MAP       --                 Physical Exam  Nursing Notes and Vital Signs Reviewed.  Physical exam is limited due to the patient's condition.   General: Patient is unresponsive with CPR in progress. Intubated.   Head: Atraumatic   Eyes: Pupils fixed and dilated bilaterally   ENT: ETT is in place   Cardiovascular: tachycardic. Sinus tach on cardiac monitor. Strong femoral pulse.   Respiratory: Intubated with equal bilateral breath sounds. Bilateral coarse breath sounds. U/S shows bilateral B lines   Abdominal: No distension   Musculoskeletal: No deformities. Extremities cold.   Skin: Pale,   Neurological: GCS 3. Not following commands after ROSC (patient given Rocuronium)       ED Course   ED US Lung  Date/Time: 5/22/2019 1:15 PM  Performed by: Jose Camacho MD  Authorized by: Jose Camacho MD   Comments: Performed by Dr. Camacho, bilateral B lines consistent with pulmonary edema    Central Line  Date/Time: 5/22/2019 3:14 PM  Performed by: Jose Camacho MD  Consent Done: Emergent Situation  Time out: Immediately prior to procedure a "time out" was called to verify the correct patient, procedure, equipment, support staff and site/side marked as required.  Indications: vascular access and med administration  Preparation: skin prepped with ChloraPrep  Skin prep agent dried: skin prep agent completely dried prior to procedure  Sterile barriers: all five maximum sterile barriers used - cap, mask, sterile gown, sterile gloves, and large sterile sheet  Hand hygiene: hand hygiene performed prior to central venous catheter insertion  Location details: left internal jugular  Catheter type: triple lumen  Catheter size: 7 Fr  Catheter Length: 16cm    Ultrasound guidance: " yes  Vessel Caliber: large, patent, compressibility normal  Needle advanced into vessel with real time Ultrasound guidance.  Guidewire confirmed in vessel.  Image recorded and saved.  Sterile sheath used.  Number of attempts: 1  Assessment: placement verified by x-ray  Complications: none  Post-procedure: line sutured,  chlorhexidine patch,  sterile dressing applied and blood return through all ports  Complications: No    Critical Care  Date/Time: 5/22/2019 3:43 PM  Performed by: Jose Camacho MD  Authorized by: Jose Camacho MD   Direct patient critical care time: 20 minutes  Additional history critical care time: 10 minutes  Ordering / reviewing critical care time: 10 minutes  Documentation critical care time: 10 minutes  Consulting other physicians critical care time: 5 minutes  Consult with family critical care time: 5 minutes  Total critical care time (exclusive of procedural time) : 60 minutes  Critical care time was exclusive of separately billable procedures and treating other patients and teaching time.  Critical care was necessary to treat or prevent imminent or life-threatening deterioration of the following conditions: cardiac failure (Acute hypoxic and hypercapnic respiratory failure; cardiac arrest; shock; significant lactic acidosis; CHF exacerbation).  Critical care was time spent personally by me on the following activities: development of treatment plan with patient or surrogate, discussions with consultants, gastric intubation, interpretation of cardiac output measurements, evaluation of patient's response to treatment, examination of patient, obtaining history from patient or surrogate, ordering and performing treatments and interventions, ordering and review of laboratory studies, ordering and review of radiographic studies, pulse oximetry, re-evaluation of patient's condition, review of old charts, blood draw for specimens, ventilator management and vascular access procedures.        ED  Vital Signs:  Vitals:    05/22/19 1333 05/22/19 1342 05/22/19 1343 05/22/19 1353   BP: 117/89  (!) 92/54 (!) 73/44   Pulse: 99 99 98    Resp: (!) 24 (!) 24     Temp:       TempSrc:       SpO2: (!) 90% 96% 96%    Weight:       Height:        05/22/19 1354 05/22/19 1413 05/22/19 1417 05/22/19 1429   BP:  (!) 72/49 (!) 79/50 (!) 83/38   Pulse: 96 91 91 88   Resp: (!) 24 (!) 24 (!) 24 (!) 24   Temp:       TempSrc:       SpO2:  (!) 82% (!) 82% (!) 85%   Weight:       Height:        05/22/19 1432 05/22/19 1436 05/22/19 1440 05/22/19 1448   BP: (!) 80/47  (!) 81/47 (!) 88/35   Pulse: 87 87 87 86   Resp: (!) 24 (!) 24 (!) 24 (!) 24   Temp:       TempSrc:       SpO2: (!) 86% (!) 90% (!) 90%    Weight:       Height:        05/22/19 1453 05/22/19 1502 05/22/19 1522   BP: (!) 83/54 (!) 82/60 (!) 110/51   Pulse: 86 87 83   Resp: (!) 28 (!) 28 (!) 28   Temp:      TempSrc:      SpO2: 98% (!) 94% (!) 59%   Weight:      Height:          Abnormal Lab Results:  Labs Reviewed   COMPREHENSIVE METABOLIC PANEL - Abnormal; Notable for the following components:       Result Value    Sodium 131 (*)     Chloride 87 (*)     CO2 8 (*)     Glucose 120 (*)     Creatinine 2.1 (*)     Albumin 3.2 (*)     Total Bilirubin 1.4 (*)     Alkaline Phosphatase 234 (*)      (*)      (*)     Anion Gap 36 (*)     eGFR if  30 (*)     eGFR if non  26 (*)     All other components within normal limits    Narrative:     CO2 critical result(s) called and verbal readback obtained from Keysha Jefferson RN, 05/22/2019 13:45   CBC W/ AUTO DIFFERENTIAL - Abnormal; Notable for the following components:    Mean Corpuscular Volume 105 (*)     Mean Corpuscular Hemoglobin 31.3 (*)     Mean Corpuscular Hemoglobin Conc 29.9 (*)     RDW 15.8 (*)     Mono # 1.5 (*)     All other components within normal limits   TROPONIN I - Abnormal; Notable for the following components:    Troponin I 0.071 (*)     All other components within normal  limits   URINALYSIS, REFLEX TO URINE CULTURE - Abnormal; Notable for the following components:    Specific Gravity, UA >=1.030 (*)     Protein, UA 1+ (*)     Ketones, UA Trace (*)     Urobilinogen, UA 4.0-6.0 (*)     All other components within normal limits    Narrative:     Preferred Collection Type->Urine, Catheterized   APTT - Abnormal; Notable for the following components:    aPTT 53.5 (*)     All other components within normal limits   LACTIC ACID, PLASMA - Abnormal; Notable for the following components:    Lactate (Lactic Acid) >12.0 (*)     All other components within normal limits    Narrative:     LA critical result(s) called and verbal readback obtained from Yamilet Lino RN, 05/22/2019 13:33   PROTIME-INR - Abnormal; Notable for the following components:    Prothrombin Time 19.6 (*)     INR 1.8 (*)     All other components within normal limits   B-TYPE NATRIURETIC PEPTIDE - Abnormal; Notable for the following components:    BNP 2,918 (*)     All other components within normal limits   URINALYSIS MICROSCOPIC - Abnormal; Notable for the following components:    RBC, UA 8 (*)     Bacteria Moderate (*)     All other components within normal limits    Narrative:     Preferred Collection Type->Urine, Catheterized   ISTAT PROCEDURE - Abnormal; Notable for the following components:    POC PH 6.917 (*)     POC PCO2 53.6 (*)     POC HCO3 10.9 (*)     POC SATURATED O2 87 (*)     POC Sodium 130 (*)     POC Ionized Calcium 1.01 (*)     All other components within normal limits   ISTAT PROCEDURE - Abnormal; Notable for the following components:    POC PH 6.918 (*)     POC PO2 142 (*)     POC HCO3 9.2 (*)     All other components within normal limits   CULTURE, BLOOD   CULTURE, BLOOD   MAGNESIUM   DRUG SCREEN PANEL, URINE EMERGENCY   HIV 1 / 2 ANTIBODY   HEPATITIS C ANTIBODY   LACTIC ACID, PLASMA        All Lab Results:  Results for orders placed or performed during the hospital encounter of 05/22/19   Comprehensive  metabolic panel   Result Value Ref Range    Sodium 131 (L) 136 - 145 mmol/L    Potassium 4.0 3.5 - 5.1 mmol/L    Chloride 87 (L) 95 - 110 mmol/L    CO2 8 (LL) 23 - 29 mmol/L    Glucose 120 (H) 70 - 110 mg/dL    BUN, Bld 19 6 - 20 mg/dL    Creatinine 2.1 (H) 0.5 - 1.4 mg/dL    Calcium 9.1 8.7 - 10.5 mg/dL    Total Protein 7.1 6.0 - 8.4 g/dL    Albumin 3.2 (L) 3.5 - 5.2 g/dL    Total Bilirubin 1.4 (H) 0.1 - 1.0 mg/dL    Alkaline Phosphatase 234 (H) 55 - 135 U/L     (H) 10 - 40 U/L     (H) 10 - 44 U/L    Anion Gap 36 (H) 8 - 16 mmol/L    eGFR if African American 30 (A) >60 mL/min/1.73 m^2    eGFR if non African American 26 (A) >60 mL/min/1.73 m^2   CBC auto differential   Result Value Ref Range    WBC 11.39 3.90 - 12.70 K/uL    RBC 4.54 4.00 - 5.40 M/uL    Hemoglobin 14.2 12.0 - 16.0 g/dL    Hematocrit 47.5 37.0 - 48.5 %    Mean Corpuscular Volume 105 (H) 82 - 98 fL    Mean Corpuscular Hemoglobin 31.3 (H) 27.0 - 31.0 pg    Mean Corpuscular Hemoglobin Conc 29.9 (L) 32.0 - 36.0 g/dL    RDW 15.8 (H) 11.5 - 14.5 %    Platelets 167 150 - 350 K/uL    MPV 11.8 9.2 - 12.9 fL    Gran # (ANC) 7.4 1.8 - 7.7 K/uL    Lymph # 2.4 1.0 - 4.8 K/uL    Mono # 1.5 (H) 0.3 - 1.0 K/uL    Eos # 0.0 0.0 - 0.5 K/uL    Baso # 0.04 0.00 - 0.20 K/uL    Gran% 68.3 38.0 - 73.0 %    Lymph% 21.2 18.0 - 48.0 %    Mono% 13.3 4.0 - 15.0 %    Eosinophil% 0.3 0.0 - 8.0 %    Basophil% 0.4 0.0 - 1.9 %    [place] Slight     Poik Slight     Poly Occasional     Target Cells Occasional     [place] Cells Occasional     Differential Method Automated    Magnesium   Result Value Ref Range    Magnesium 1.6 1.6 - 2.6 mg/dL   Troponin I   Result Value Ref Range    Troponin I 0.071 (H) 0.000 - 0.026 ng/mL   Urinalysis, Reflex to Urine Culture Urine, Catheterized   Result Value Ref Range    Specimen UA Urine, Catheterized     Color, UA Yellow Yellow, Straw, Claudette    Appearance, UA Clear Clear    pH, UA 6.0 5.0 - 8.0    Specific Gravity, UA >=1.030 (A) 1.005 -  1.030    Protein, UA 1+ (A) Negative    Glucose, UA Negative Negative    Ketones, UA Trace (A) Negative    Bilirubin (UA) Negative Negative    Occult Blood UA Negative Negative    Nitrite, UA Negative Negative    Urobilinogen, UA 4.0-6.0 (A) <2.0 EU/dL    Leukocytes, UA Negative Negative   APTT   Result Value Ref Range    aPTT 53.5 (H) 21.0 - 32.0 sec   Lactic acid, plasma   Result Value Ref Range    Lactate (Lactic Acid) >12.0 (HH) 0.5 - 2.2 mmol/L   Protime-INR   Result Value Ref Range    Prothrombin Time 19.6 (H) 9.0 - 12.5 sec    INR 1.8 (H) 0.8 - 1.2   Brain natriuretic peptide   Result Value Ref Range    BNP 2,918 (H) 0 - 99 pg/mL   Urinalysis Microscopic   Result Value Ref Range    RBC, UA 8 (H) 0 - 4 /hpf    WBC, UA 1 0 - 5 /hpf    Bacteria Moderate (A) None-Occ /hpf    Hyaline Casts, UA 0 0-1/lpf /lpf    Microscopic Comment SEE COMMENT    ISTAT PROCEDURE   Result Value Ref Range    POC PH 6.917 (LL) 7.35 - 7.45    POC PCO2 53.6 (H) 35 - 45 mmHg    POC PO2 85 80 - 100 mmHg    POC HCO3 10.9 (L) 24 - 28 mmol/L    POC BE -22 -2 to 2 mmol/L    POC SATURATED O2 87 (L) 95 - 100 %    POC Glucose 92 70 - 110 mg/dL    POC Sodium 130 (L) 136 - 145 mmol/L    POC Potassium 4.2 3.5 - 5.1 mmol/L    POC Ionized Calcium 1.01 (L) 1.06 - 1.42 mmol/L    POC Hematocrit 45 36 - 54 %PCV    Rate 16     Sample ARTERIAL     Site LB     Allens Test Pass     DelSys Adult Vent     Mode AC/PRVC     Vt 400     PEEP 10     FiO2 100    ISTAT PROCEDURE   Result Value Ref Range    POC PH 6.918 (LL) 7.35 - 7.45    POC PCO2 45.0 35 - 45 mmHg    POC PO2 142 (H) 80 - 100 mmHg    POC HCO3 9.2 (L) 24 - 28 mmol/L    POC BE -23 -2 to 2 mmol/L    POC SATURATED O2 97 95 - 100 %    Rate 24     Sample ARTERIAL     Site RR     Allens Test Pass     DelSys Adult Vent     Mode AC/PRVC     Vt 400     PEEP 12     FiO2 100          Imaging Results:  Imaging Results          X-Ray Chest AP Portable (Final result)  Result time 05/22/19 13:10:30    Final result  by ALEX Casey Sr., MD (05/22/19 13:10:30)                 Impression:      1. There is a moderate amount of alveolar consolidation scattered throughout both lungs.  This is characteristic of pneumonia.  2. There is opacification of the left costophrenic angle. There is blunting of the right costophrenic angle.  This is characteristic of pleural effusions.  3. An endotracheal tube is in place. The tip is located 32 mm superior to the robbi. The tip of the NG tube is in the region of the gastroesophageal junction.  4. There are several oval-shaped osseous densities projected superior to the left humeral head. One of the larger ones measures 7 mm.  These are characteristic of fracture fragments.  5. There are multiple sternotomy wires in place.  .      Electronically signed by: Yovani Casey MD  Date:    05/22/2019  Time:    13:10             Narrative:    EXAMINATION:  XR CHEST AP PORTABLE    CLINICAL HISTORY:  shortness of breath;    COMPARISON:  None    FINDINGS:  There are multiple sternotomy wires in place.  An endotracheal tube is in place.  The tip is located 32 mm superior to the robbi.  The tip of the NG tube is in the region of the gastroesophageal junction.  The size of the heart is prominent.  There is a moderate amount of alveolar consolidation scattered throughout both lungs.  There is opacification of the left costophrenic angle.  There is blunting of the right costophrenic angle.  There are several oval-shaped osseous densities projected superior to the left humeral head.  One of the larger ones measures 7 mm.  There is no pneumothorax.                                 The EKG was ordered, reviewed, and independently interpreted by the ED provider.  Interpretation time: 1245  Rate: 119 BPM  Rhythm: sinus tachycardia with occasional premature ventricular complexes  Interpretation: Possible Left atrial enlargement. Right axis deviation. Possible Right ventricular hypertrophy. No STEMI.            The Emergency Provider reviewed the vital signs and test results, which are outlined above.     ED Discussion         12:40 PM: Pt arrived by EMS. Pt was unresponsive on arrival. Compressions in progress by EMS on arrival    12:42 PM: Pt in ED bed 4.  Pt moved to stretcher. ED staff began CPR and placed chest pads. Palpable pulse at this time.    12:43 PM: Pulse check at this time. Pulse noted L femoral, sinus tach rate 117    12:49 PM: US of lung performed which reveals bilateral B lines suggestive of CHF; normotensive; lasix given    13:07 PM: Respitory at bedside for ABG    14:15 PM: Initial presentaiton concerning for CHF exacerbation with hypoxic arrest while patient was placed in a supine position for intubation; ultrasound did show bilateral B lines and Lasix was given; patient's blood pressure however has steadily declined here in the emergency department; x-ray was read as pneumonia and patient has elevated lactate; diagnosis of CHF versus pneumonia; at this point I am obligated to give 30 mils per kg of IV fluids; discussed with Hospital Medicine and they agree; will give 30 mils per kg of ideal body weight per hospital medicine recommendations; will also start patient on Levophed    14:24 PM: Daughter now at bedside and provided collateral for patient. Daughter stated patient Hx of CHF, Gout, and Bilat PE (10 years ago). Daughter stated patient has not taken diuretics in one week. Reports patient was fine yesterday and was speaking on phone with patient 5 minutes before EMS called.  This additional history also concerning for CHF exacerbation.  Hospital medicine notified    14:43 PM: Discussed case with Ivana Novak ([facility]). Dr. Curry agrees with current care and management of pt and accepts admission.   Admitting Service: Hospital Medicine  Admitting Physician: Dr. Curry  Admit to: ICU    14:46 PM: Re-evaluated pt. I have discussed test results, shared treatment plan, and the need  for admission with family at bedside. Family express understanding at this time and agree with all information. All questions answered. Family has no further questions or concerns at this time. Pt is ready for admit.    Re-exam:  Patient was re-examined after IV fluid bolus and administration Levophed.  Patient now has a map over 65.  Heart rate within normal limits. Extremities remain cold.  Bilateral lungs exam remains course and crackles.  Patient is maintaining O2 sats but requriing high PEEP and high Fio2 levels.  Still no signs of purposeful neurologic activity      ED Medication(s):  Medications   fentaNYL 2500 mcg in D5W 250 mL infusion premix (titrating) (conc: 10 mcg/mL) (0 mcg/hr Intravenous Paused 5/22/19 1405)   vancomycin (VANCOCIN) 2,500 mg in dextrose 5 % 500 mL IVPB (2,500 mg Intravenous New Bag 5/22/19 1417)   norepinephrine 4 mg in dextrose 5% 250 mL infusion (premix) (titrating) (0.2 mcg/kg/min × 89.6 kg Intravenous Rate/Dose Change 5/22/19 1459)   sodium chloride 0.9% bolus 1,800 mL (1,800 mLs Intravenous New Bag 5/22/19 1415)   0.9%  NaCl infusion ( Intravenous Stopped 5/22/19 1252)   furosemide injection 80 mg (80 mg Intravenous Given 5/22/19 1327)   piperacillin-tazobactam 4.5 g in dextrose 5 % 100 mL IVPB (ready to mix system) (0 g Intravenous Stopped 5/22/19 1417)       New Prescriptions    No medications on file                 Medical Decision Making:   Clinical Tests:   Lab Tests: Ordered and Reviewed  Radiological Study: Ordered and Reviewed  Medical Tests: Ordered and Reviewed  58-year-old female who presented in cardiac arrest; history given by EMS was most suggestive of CHF exacerbation leading to arrest; patient has a history of CHF and was reportedly acutely short of breath which prompted the EMS call; patient arrested whenever she was laid flat for intubation also suggestive of CHF; on initial pulse check in the emergency department, patient had ROSC; extremities were cold and  patient was normotensive; she did not require any shocks; my initial thought was that patient had a arrest from pulmonary edema whenever she was laid flat for intubation consistent with severe CHF; ultrasound was performed at bedside which showed bilateral B lines again suspicious for CHF; patient was normotensive after ROSC; she was difficult to oxygenate requiring 100% oxygen and PEEP of 12; and at this time Lasix was given for presumed CHF; broad-spectrum antibiotics were given upon arrival as well just in case; labs eventually came back and could be consistent with a CHF and cardiac arrest with elevated BNP and liver enzymes and elevated lactate likely due to hypoperfusion during the arrest; however chest x-ray was read as pneumonia and labs could also be consistent with a pneumonia septic shock picture; during her stay in the emergency department, patient's blood pressure did start to lower and patient did become hypotensive; at this time with pneumonia septic shock still a possibility, I discussed with hospital medicine about 30 mils per kg of IV fluids; hospital medicine agreed the patient needed a fluid bolus with septic shock not ruled out at this time; initially Ivana recommended 30 mls per kg of ideal body weight; hospital medicine physician then came down and recommended 30 mls per kg of actual Body weight; central line was placed at the same time fluids were started and Levophed was started; levophed maintained MAP > 65; STAT echocardiogram was ordered; patient never made any purposeful movement in the emergency department and patient's prognosis is poor with cardiogenic shock versus septic shock; patient will be admitted to the ICU in critical condition             Scribe Attestation:   Scribe #1: I performed the above scribed service and the documentation accurately describes the services I performed. I attest to the accuracy of the note.     Attending:   Physician Attestation Statement for Scribe  #1: I, Jose Camacho MD, personally performed the services described in this documentation, as scribed by Deborah Romero, in my presence, and it is both accurate and complete.           Clinical Impression       ICD-10-CM ICD-9-CM   1. Acute respiratory failure with hypoxia and hypercapnia J96.01 518.81    J96.02    2. Cardiac arrest I46.9 427.5   3. Shock R57.9 785.50   4. Acute on chronic congestive heart failure, unspecified heart failure type I50.9 428.0   5. Pneumonia of both lungs due to infectious organism, unspecified part of lung J18.9 483.8   6. Sepsis, due to unspecified organism A41.9 038.9     995.91   7. Elevated brain natriuretic peptide (BNP) level R79.89 790.99   8. Abnormal LFTs R94.5 790.6   9. Acute renal failure, unspecified acute renal failure type N17.9 584.9   10. Elevated troponin R74.8 790.6   11. Butler coma scale total score 3-8, at arrival to emergency department R40.2432 780.01   12.     Lactic Acidosis    Disposition:   Disposition: Admitted  Condition: Critical         Jose Camacho MD  05/23/19 0997

## 2019-05-22 NOTE — CONSULTS
[facility]  Nephrology  Consult Note      Patient Name: Gurvinder Rankin  MRN: [id number]  Admission Date: 5/22/2019  Hospital Length of Stay: 0 days  Attending Provider: Aristides Curry MD   Primary Care Physician: Provider Notinsystem  Principal Problem:Cardiopulmonary arrest with successful resuscitation     Referring physician: Dr Barr  Reason for consult: severe metabolic acidosis, shock    Consults  Subjective:     HPI: Pt was seen and examined in ICU. H/o and chart reviewed. Pt is a 59 y/o female s/p cardiac arrest. Pt is critically ill with cardiogenic shock and severe metabolic acidosis (PH 6.9). Pt has signifiacnt cardiac issues including severe systolic CHF with EF 20% and h/o of AVR. Admit notes reviewed. Noted pt was hypoxic and bradycardic in the filed and developed PEA. Labs, meds, mgmt reviewed in details.    Past Medical History:   Diagnosis Date    CHF (congestive heart failure)     Chronic back pain     Constipation due to pain medication     Diabetes mellitus     Epilepsy     MAICOL (generalized anxiety disorder)     Gout     Pulmonary embolus        Past Surgical History:   Procedure Laterality Date    BRAIN SURGERY      Doubel heart valve replacement      JOINT REPLACEMENT      lumbar back surgery 2001      TOTAL KNEE ARTHROPLASTY      Left       Review of patient's allergies indicates:   Allergen Reactions    Acetaminophen Swelling     Current Facility-Administered Medications   Medication Frequency    0.9%  NaCl infusion (CRRT USE ONLY) Continuous    albuterol-ipratropium 2.5 mg-0.5 mg/3 mL nebulizer solution 3 mL Q6H WAKE    bisacodyl suppository 10 mg Daily PRN    chlorhexidine 0.12 % solution 15 mL BID    dextrose 50% injection 12.5 g PRN    [START ON 5/23/2019] famotidine (PF) injection 20 mg Daily    glucagon (human recombinant) injection 1 mg PRN    glucose chewable tablet 16 g PRN    glucose chewable tablet 24 g PRN    heparin 25,000 units in  dextrose 5% (100 units/ml) IV bolus from bag - ADDITIONAL PRN BOLUS - 30 units/kg PRN    heparin 25,000 units in dextrose 5% (100 units/ml) IV bolus from bag - ADDITIONAL PRN BOLUS - 60 units/kg PRN    heparin 25,000 units in dextrose 5% 250 mL (100 units/mL) infusion HIGH INTENSITY nomogram - [facility] Continuous    ibuprofen tablet 400 mg Q6H PRN    insulin aspart U-100 pen 0-5 Units Q6H PRN    levETIRAcetam in NaCl (iso-os) IVPB 500 mg Q12H    magnesium sulfate 2g in water 50mL IVPB (premix) Once    magnesium sulfate 2g in water 50mL IVPB (premix) PRN    NORepinephrine bitartrate 8 mg in dextrose 5% 250 mL infusion Continuous    ondansetron injection 4 mg Q8H PRN    piperacillin-tazobactam 4.5 g in dextrose 5 % 100 mL IVPB (ready to mix system) Q8H    [START ON 5/23/2019] polyethylene glycol packet 17 g Daily    sodium chloride 0.9% flush 10 mL PRN    sodium phosphate 20.01 mmol in dextrose 5 % 250 mL IVPB PRN    sodium phosphate 30 mmol in dextrose 5 % 250 mL IVPB PRN    sodium phosphate 39.99 mmol in dextrose 5 % 250 mL IVPB PRN     Family History     Problem Relation (Age of Onset)    Hypertension Mother        Tobacco Use    Smoking status: Former Smoker   Substance and Sexual Activity    Alcohol use: Not Currently     Frequency: Never    Drug use: Never    Sexual activity: Not Currently     Review of Systems   Unable to perform ROS: Intubated     Objective:     Vital Signs (Most Recent):  Temp: 97.6 °F (36.4 °C) (05/22/19 1645)  Pulse: 77 (05/22/19 1745)  Resp: (!) 30 (05/22/19 1745)  BP: (!) 77/44 (05/22/19 1745)  SpO2: (!) 89 % (05/22/19 1710)  O2 Device (Oxygen Therapy): ventilator (05/22/19 1710) Vital Signs (24h Range):  Temp:  [97.6 °F (36.4 °C)-99 °F (37.2 °C)] 97.6 °F (36.4 °C)  Pulse:  [] 77  Resp:  [16-37] 30  SpO2:  [59 %-98 %] 89 %  BP: ()/(35-89) 77/44     Weight: 89.6 kg (197 lb 9.6 oz) (05/22/19 1300)  Body mass index is 31.89 kg/m².  Body surface area is 2.04 meters  squared.    No intake/output data recorded.    Physical Exam   Constitutional: She appears well-developed and well-nourished.   HENT:   Head: Normocephalic and atraumatic.   Neck: No JVD present.   Cardiovascular: Normal rate and regular rhythm. Exam reveals no friction rub.   Pulmonary/Chest: She has rales.   intubated   Abdominal: Soft.   Musculoskeletal: She exhibits no edema.   Skin: Skin is warm.   Nursing note and vitals reviewed.      Significant Labs: reviewed  BMP  Lab Results   Component Value Date     (L) 05/22/2019    K 4.0 05/22/2019    CL 87 (L) 05/22/2019    CO2 8 (LL) 05/22/2019    BUN 19 05/22/2019    CREATININE 2.1 (H) 05/22/2019    CALCIUM 9.1 05/22/2019    ANIONGAP 36 (H) 05/22/2019    ESTGFRAFRICA 30 (A) 05/22/2019    EGFRNONAA 26 (A) 05/22/2019     Lab Results   Component Value Date    WBC 11.39 05/22/2019    HGB 14.2 05/22/2019    HCT 45 05/22/2019     (H) 05/22/2019     05/22/2019     ABG  Recent Labs   Lab 05/22/19  1713   PH 6.912*   PO2 89   PCO2 42.7   HCO3 8.6*   BE -24     Recent Labs   Lab 05/22/19  1722   TROPONINI 0.436*         Significant Imaging: reviewed    Assessment/Plan:   59 y/o female with life threatening, severe metabolic acidosis post cardiac arrest:    Metabolic acidosis  Severe metabolic acidosis with life threatening level  Noted acidemia has worsened since admit  Lactic acidosis  Hypotension  K is normal  Cardiogenic shock  Acute kidney injury  Will require urgent dialysis  Will not tolerate conventional dialysis  Will provide CRRT. Discussed with HD nurse  Orders placed in epic    * Cardiopulmonary arrest with successful resuscitation  Has cardiac arrest at home  Significant h/o of multiple cardiac issues  Severe systolic CHF  Pulmonary HTN  H/o of PE  CAD  Noted young age  Discussed with ICU nurse. Drips, meds, IVF's reviewed and managed with ICU nurse.      ID: pulmonary infiltrates  Likely has pneumonia  May have aspirated  Abx  reviewed        Plans and recommendations:  As discussed above  Reviewed dialysis orders with HD nurse  Total critical care time spent 70 minutes including time needed to review the records, the   patient evaluation, documentation, face-to-face discussion with the patient,   more than 50% of the time was spent on coordination of care and counseling.    Level V visit.  Multiple medical issues addressed. Medical care provided was in addition to providing dialysis, as documented. Additional charge will apply  Pt received multiple visits and evaluations in ICU.      Daniel Ramos MD   Nephrology  [facility]

## 2019-05-22 NOTE — PROGRESS NOTES
Clinical Pharmacy: Vancomycin Consult Signoff Note    Therapy with vancomycin complete and/or consult discontinued by provider.  Pharmacy will sign off, please re-consult as needed.    Thank you for allowing us to participate in this patient's care.   Katherine McArdle, Pharm.D. 5/22/2019 5:09 PM

## 2019-05-22 NOTE — PROGRESS NOTES
Rings removed off of both hands and given to pt daughter.  Pt daughter verbalized that all the rings were accounted for and she will take them home.

## 2019-05-22 NOTE — ED NOTES
NG tube advance by ASHLEY Youngblood per Xray instruction. Dr. Camacho to bedside for xray review on central line. Ok to use central line at this  time.

## 2019-05-22 NOTE — CONSULTS
"[facility]  Critical Care Medicine  Consult Note    Patient Name: Gurvinder Rankin  MRN: [id number]  Admission Date: 5/22/2019  Hospital Length of Stay: 0 days  Code Status: Full Code  Attending Physician: Aristides Curry MD   Primary Care Provider: Provider Notinsystem   Principal Problem: Cardiopulmonary arrest with successful resuscitation      Subjective:     HPI:  Ms Ascencio is a 57 yo female with a PMH of CHF on diuretics at home, Seizure D/O, PE, chronic lumbar back pain on chronic narcotics,  Chronic constipation due to narcotics, MAICOL, DM, Gastroparesis, Gout and "double heart valve surgery".  According to records, Ms Ascencio was at home and called EMS due to acute onset of SOB.  EMS arrived to find patient in resp distress, diaphoretic, RA SAT 70%, glucose 24.  D50 IVP given and attempted CPAP.  CPAP unsuccessful and reportedly intubated on scene.  Patient had Bradycardia to PEA and ACLS started.  Received Epi X 2 but no Defib and arrived to [facility] ED with CPR in progress.  First pulse check in ED ROSC attained.  CL placed in ED and according to ED records re-intubated.  IV Lasix given then due to elevated LA > 12 given IVF bolus for sepsis protocol.  In ED serum CO2 8, creatine 2.1 (last creatine 1.3), AG 36, INR 1.8, Trop 0.07, BNP 2918, ABG 6.92/54/85/11 and CXR with diffuse patchy infiltrates and pleural effusions.  Daughter reported in ED patient stopped taking her diuretics 1 week ago due to severe gout.  She follows with Dr. Smith for Cards and at times has to get IV Lasix.  Daughter reports she has IVC filter from her previous PE but is not on home anticoagulants.  Also, she has been having increased weight gain, JUAREZ and chest congestion over last few days but no fever.  Her previous valve surgery was Aortic and Tricuspid done in 2012.    Hospital/ICU Course:  5/22 - Admitted to ICU intubated on mechanical ventilation unresponsive not on sedation but requiring Levophed infusion.  " Cyanosis of distal extremities noted.  CT head not done prior to arrival to ICU.     Past Medical History:   Diagnosis Date    CHF (congestive heart failure)     Chronic back pain     Constipation due to pain medication     Diabetes mellitus     Epilepsy     MAICOL (generalized anxiety disorder)     Gout     Pulmonary embolus        Past Surgical History:   Procedure Laterality Date    BRAIN SURGERY      Doubel heart valve replacement      JOINT REPLACEMENT      lumbar back surgery 2001      TOTAL KNEE ARTHROPLASTY      Left       Review of patient's allergies indicates:   Allergen Reactions    Acetaminophen Swelling       Family History     Problem Relation (Age of Onset)    Hypertension Mother        Tobacco Use    Smoking status: Former Smoker   Substance and Sexual Activity    Alcohol use: Not Currently     Frequency: Never    Drug use: Never    Sexual activity: Not Currently         Review of Systems   Unable to perform ROS: Intubated     Objective:     Vital Signs (Most Recent):  Temp: 99 °F (37.2 °C) (05/22/19 1320)  Pulse: 105 (05/22/19 1643)  Resp: (!) 30 (05/22/19 1643)  BP: 99/67 (05/22/19 1619)  SpO2: (!) 81 % (05/22/19 1643) Vital Signs (24h Range):  Temp:  [99 °F (37.2 °C)] 99 °F (37.2 °C)  Pulse:  [] 105  Resp:  [16-37] 30  SpO2:  [59 %-98 %] 81 %  BP: ()/(35-89) 99/67     Weight: 89.6 kg (197 lb 9.6 oz)  Body mass index is 31.89 kg/m².      Intake/Output Summary (Last 24 hours) at 5/22/2019 1707  Last data filed at 5/22/2019 1417  Gross per 24 hour   Intake 750 ml   Output --   Net 750 ml       Physical Exam   Constitutional: She appears well-developed and well-nourished. She has a sickly appearance. She appears ill. No distress. She is intubated.   HENT:   Head: Normocephalic and atraumatic.   Mouth/Throat: Oropharynx is clear and moist and mucous membranes are normal.   Eyes: Lids are normal. Right pupil is reactive. Left pupil is not round. Left pupil is reactive.    Neck: Trachea normal. Neck supple. Carotid bruit is not present.       Cardiovascular: Normal rate, regular rhythm and normal heart sounds.   Pulses:       Radial pulses are 1+ on the right side, and 1+ on the left side.        Dorsalis pedis pulses are 0 on the right side, and 0 on the left side.        Posterior tibial pulses are Detected w/ doppler on the right side, and Detected w/ doppler on the left side.   Pulmonary/Chest: Effort normal. No accessory muscle usage. She is intubated. No respiratory distress. She has decreased breath sounds. She has rales in the left upper field, the left middle field and the left lower field.   Abdominal: Soft. She exhibits no distension. Bowel sounds are absent. There is no tenderness.   Obese    Genitourinary:   Genitourinary Comments: Russell in place   Musculoskeletal:        Right foot: There is no deformity.        Left foot: There is no deformity.   No edema   Lymphadenopathy:     She has no cervical adenopathy.   Neurological: She is unresponsive.   No gag, corneal or carini reflex.  No withdrawal to pain and no spont or purposeful movements.  Eyes closed not opening to pain.     Skin: Skin is dry and intact. Capillary refill takes more than 3 seconds. No rash noted. There is cyanosis (distal LEs).            Vents:  Vent Mode: A/C (05/22/19 1643)  Ventilator Initiated: Yes (05/22/19 1342)  Set Rate: 30 bmp (05/22/19 1643)  Vt Set: 350 mL (05/22/19 1643)  Pressure Support: 0 cmH20 (05/22/19 1643)  PEEP/CPAP: 12 cmH20 (05/22/19 1643)  Oxygen Concentration (%): 100 (05/22/19 1643)  Peak Airway Pressure: 34 cmH2O (05/22/19 1643)  Plateau Pressure: 0 cmH20 (05/22/19 1643)  Total Ve: 11.3 mL (05/22/19 1643)  F/VT Ratio<105 (RSBI): (!) 71.6 (05/22/19 1643)    Lines/Drains/Airways     Central Venous Catheter Line                 Percutaneous Central Line Insertion/Assessment - triple lumen  05/22/19 1540 left internal jugular less than 1 day          Drain                  NG/OG Tube 05/22/19 1248 14 Fr. Right mouth less than 1 day         Urethral Catheter 05/22/19 1247 Latex 16 Fr. less than 1 day          Airway                 Airway - Non-Surgical 05/22/19 1239 Endotracheal Tube less than 1 day          Peripheral Intravenous Line                 Peripheral IV - Single Lumen 05/22/19 1240 20 G Left Wrist less than 1 day         Peripheral IV - Single Lumen 05/22/19 1243 20 G Right Antecubital less than 1 day                Significant Labs:    CBC/Anemia Profile:  Recent Labs   Lab 05/22/19  1255 05/22/19  1328   WBC 11.39  --    HGB 14.2  --    HCT 47.5 45     --    *  --    RDW 15.8*  --         Chemistries:  Recent Labs   Lab 05/22/19  1255   *   K 4.0   CL 87*   CO2 8*   BUN 19   CREATININE 2.1*   CALCIUM 9.1   ALBUMIN 3.2*   PROT 7.1   BILITOT 1.4*   ALKPHOS 234*   *   *   MG 1.6       ABGs:   Recent Labs   Lab 05/22/19  1445   PH 6.918*   PCO2 45.0   HCO3 9.2*   POCSATURATED 97   BE -23     Coagulation:   Recent Labs   Lab 05/22/19  1255   INR 1.8*   APTT 53.5*     Lactic Acid:   Recent Labs   Lab 05/22/19  1255 05/22/19  1528   LACTATE >12.0* >12.0*     Troponin:   Recent Labs   Lab 05/22/19  1255   TROPONINI 0.071*     Urine Studies:   Recent Labs   Lab 05/22/19  1253   COLORU Yellow   APPEARANCEUA Clear   PHUR 6.0   SPECGRAV >=1.030*   PROTEINUA 1+*   GLUCUA Negative   KETONESU Trace*   BILIRUBINUA Negative   OCCULTUA Negative   NITRITE Negative   UROBILINOGEN 4.0-6.0*   LEUKOCYTESUR Negative   RBCUA 8*   WBCUA 1   BACTERIA Moderate*   HYALINECASTS 0     All pertinent labs within the past 24 hours have been reviewed.    Significant Imaging:   CXR: I have reviewed all pertinent results/findings within the past 24 hours and my personal findings are:  diffuse patchy and interstitial infiltrates with pleural effusions      ABG  Recent Labs   Lab 05/22/19  1713   PH 6.912*   PO2 89   PCO2 42.7   HCO3 8.6*   BE -24     Assessment/Plan:      Neuro  Seizure disorder  Start Keppra  Monitor for seizure activity  Add Propofol if TTM started    Hypoxic encephalopathy  Reportedly did receive some Rocuronium for unknown reason may be cause of comatose state  Due to Cardiac arrest will start TTM if CT head unremarkable for ICH  ICU neuro monitoring    Pulmonary  Pulmonary infiltrates on CXR  Suspect Pulm Edema and doubt PNA  Cont Zosyn for now  Stop Vanc  Blood cultures X 2 pending  Check sputum culture  Daily CXR    Acute respiratory failure with hypoxia and hypercarbia  Cont full vent support  VAP prophylaxis  Vent settings reviewed and adjusted multiple times  ICU Pulm Monitoring and support      Cardiac/Vascular  * Cardiopulmonary arrest with successful resuscitation  Cont ICU hemodynamic monitoring and support on Levophed infusion      Hx of double heart valve surgery  Hx of Aortic and Tricuspid valve replacement in 2012 per daughter  Starting Heparin infusion  Echo pending    Acute on chronic systolic heart failure  Review of pharmacy meds reveal she has not been filling her Lasix, Spirinolactone and Metolazone meds at home pharmacy  Due to shock unable to diurese aggresively  Will start CRRT    Renal/  SHEREE (acute kidney injury)  Renal consulted  Planning CRRT    Metabolic acidosis, increased anion gap (IAG)  Consulted Renal  Will attempt placement of Trialysis catheter and CRRT asap  Serial BMPs    Hematology  History of pulmonary embolism  Possible cause of cardiac arrest  Daughter reports patient has IVC filter  Check LE doppler US and cardiac Echo  Start Heparin infusion  If Echo shows RV strain will need to consider CTA chest to rule out PE even given SHEREE    Endocrine  Type 2 diabetes mellitus with hypoglycemia, without long-term current use of insulin  Add low scale SSI    GI  Chronic constipation  Miralax and PRN Dulcolax    Shock liver  Support BP and repeat LFTs with CMP in AM    Orthopedic  Chronic midline low back pain w/ chronic narcotic  use  Holding any narcotics for neuro assessment  On home Lyrica and Oxy on review of home meds       Preventive Measures and Monitoring:   Stress Ulcer: Pepcid  Nutrition: NPO  Glucose control: SSI  Bowel prophylaxis: Miralax and PRN Dulcolax  DVT prophylaxis: Heparin infusion  Hx CAD on B-Blocker: no hx CAD  Head of Bed/Reposition: Elevate HOB and turn Q1-2 hours   Early Mobility: bed rest  SAT/SBT: once awake  Vent Day: #1  OG Day: #1  Central Line Left IJ Day: #1  Right Radial Arterial Line Day: #1  Russell Day: #1  IVAB Day: #1  Code Status: Full  Pneumonia Vaccine: ordered    Counseling/Consultation:I have discussed the care of this patient in detail with the bedside nursing staff and Dr. Barr and Dr. Ramos and Dr. Curry    Critical Care Time: 82 minutes  Critical secondary to Patient has a condition that poses threat to life and bodily function: Acute Renal Failure and [facility]  Patient has an abrupt change in neurologic status: Hypoxic Encephalopathy  Patient is currently on drug therapy requiring intensive monitoring for toxicity: Levophed infusion     Critical care was time spent personally by me on the following activities: development of treatment plan with patient or surrogate and bedside caregivers, discussions with consultants, evaluation of patient's response to treatment, examination of patient, ordering and performing treatments and interventions, ordering and review of laboratory studies, ordering and review of radiographic studies, pulse oximetry, re-evaluation of patient's condition. This critical care time did not overlap with that of any other provider or involve time for any procedures.    Thank you for your consult. I will follow-up with patient. Please contact us if you have any additional questions.     Theodore Michel NP  Critical Care Medicine  [facility]

## 2019-05-22 NOTE — SIGNIFICANT EVENT
[facility]  6 Hours Sepsis Perfusion Exam   Provider Attestation             Tissue Perfusion Assessment        Vital signs reviewed. A focused perfusion assessment was completed.      Aristides Curry

## 2019-05-22 NOTE — ED NOTES
Dr. Curry [facility] at bedside. Calculated 30 mg/kg bolus by Dr. Curry and given orders to ensure that 30 mg/kg bolus is completed per sepsis protocol. Also received orders to get repeat lactic at this time.

## 2019-05-23 PROBLEM — E11.649 TYPE 2 DIABETES MELLITUS WITH HYPOGLYCEMIA, WITHOUT LONG-TERM CURRENT USE OF INSULIN: Status: RESOLVED | Noted: 2019-01-01 | Resolved: 2019-01-01

## 2019-05-23 PROBLEM — Z95.2 HX OF REPLACEMENT OF AORTIC VALVE: Chronic | Status: RESOLVED | Noted: 2019-01-01 | Resolved: 2019-01-01

## 2019-05-23 PROBLEM — G89.29 CHRONIC MIDLINE LOW BACK PAIN: Chronic | Status: RESOLVED | Noted: 2019-01-01 | Resolved: 2019-01-01

## 2019-05-23 PROBLEM — K59.09 CHRONIC CONSTIPATION: Chronic | Status: RESOLVED | Noted: 2019-01-01 | Resolved: 2019-01-01

## 2019-05-23 PROBLEM — I46.9 CARDIOPULMONARY ARREST WITH SUCCESSFUL RESUSCITATION: Status: RESOLVED | Noted: 2019-01-01 | Resolved: 2019-01-01

## 2019-05-23 PROBLEM — Z86.711 HISTORY OF PULMONARY EMBOLISM: Chronic | Status: RESOLVED | Noted: 2019-01-01 | Resolved: 2019-01-01

## 2019-05-23 PROBLEM — I50.23 ACUTE ON CHRONIC SYSTOLIC HEART FAILURE: Status: RESOLVED | Noted: 2019-01-01 | Resolved: 2019-01-01

## 2019-05-23 PROBLEM — G93.1 HYPOXIC ENCEPHALOPATHY: Status: RESOLVED | Noted: 2019-01-01 | Resolved: 2019-01-01

## 2019-05-23 PROBLEM — Z51.5 COMFORT MEASURES ONLY STATUS: Status: ACTIVE | Noted: 2019-01-01

## 2019-05-23 PROBLEM — M54.50 CHRONIC MIDLINE LOW BACK PAIN: Chronic | Status: RESOLVED | Noted: 2019-01-01 | Resolved: 2019-01-01

## 2019-05-23 PROBLEM — R91.8 PULMONARY INFILTRATES ON CXR: Status: RESOLVED | Noted: 2019-01-01 | Resolved: 2019-01-01

## 2019-05-23 PROBLEM — E87.20 METABOLIC ACIDOSIS: Status: RESOLVED | Noted: 2019-01-01 | Resolved: 2019-01-01

## 2019-05-23 PROBLEM — J96.02 ACUTE RESPIRATORY FAILURE WITH HYPOXIA AND HYPERCAPNIA: Status: RESOLVED | Noted: 2019-01-01 | Resolved: 2019-01-01

## 2019-05-23 PROBLEM — N17.9 AKI (ACUTE KIDNEY INJURY): Status: RESOLVED | Noted: 2019-01-01 | Resolved: 2019-01-01

## 2019-05-23 PROBLEM — J96.01 ACUTE RESPIRATORY FAILURE WITH HYPOXIA AND HYPERCAPNIA: Status: RESOLVED | Noted: 2019-01-01 | Resolved: 2019-01-01

## 2019-05-23 PROBLEM — K72.00 SHOCK LIVER: Status: RESOLVED | Noted: 2019-01-01 | Resolved: 2019-01-01

## 2019-05-23 PROBLEM — Z51.5 COMFORT MEASURES ONLY STATUS: Status: RESOLVED | Noted: 2019-01-01 | Resolved: 2019-01-01

## 2019-05-23 PROBLEM — G40.909 SEIZURE DISORDER: Chronic | Status: RESOLVED | Noted: 2019-01-01 | Resolved: 2019-01-01

## 2019-05-23 NOTE — ASSESSMENT & PLAN NOTE
Recommend continuing current aggressive supportive care   Patient BP not responding to multiple pressors   Unable to tolerate CRRT at this time, so remains acidotic   Echo shows LVEF 20%  Cardiology recommends conservative medical management at this time.   Will be available as needed

## 2019-05-23 NOTE — EICU
Severe low BP  Add vasopressin, steroids, dopamine ( HR still 60's)  Likely cardiogenic  Temporize with more bicarb for severe acidosis  Cardiology input on any further option  Spoke to daughter and advised of critical status and possibility of refractory shock     0635  Spoke with Dr Lawler  of cardiology - ? Role for IABP- will review

## 2019-05-23 NOTE — PROGRESS NOTES
pts heart rate and blood pressure continue to slowly drop despite pt being maxed on 3 pressors.  Pt continues to get more cyanotic throughout.  pts , brother, and daughter at bedside.   states he wants to stop all drips that are infusing.  SANTOS Reese NP notified.     SANTOS Reese NP at bedside.  All drips stopped by SANTOS Reese NP.  Family at bedside.

## 2019-05-23 NOTE — PROGRESS NOTES
Med list provided from pharmacist entered into chart.  List also placed in pt paper chart with exact date of meds last filled.  Per med list pt not taking multiple meds as prescribed.

## 2019-05-23 NOTE — PROGRESS NOTES
Pt in bed, eyes open.  Pt does not follow commands.  7.0 ETT noted, 25 cm at the lips, VC+ settings on vent, RAte: 26, tidal volume: 350, Fi02: 40%, PEEP: 10. Pt not breathing over ventilator at present.  NSR on telemonitor, HR 68.  Pt maxed out on levophed, vasopressin, and dopamine, b/ps remain in the 60s-70s/40s.  Pt mottled from chest down.  behr hugger currently on pt, bladder temp 95.2F.  Dr. Barr and SANTOS Reese, NP at bedside talking with pts .

## 2019-05-23 NOTE — PROGRESS NOTES
"[facility]  Critical Care Medicine  Progress Note    Patient Name: Gurvinder Rankin  MRN: [id number]  Admission Date: 5/22/2019  Hospital Length of Stay: 1 days  Code Status: Full Code  Attending Provider: Aristides Curry MD  Primary Care Provider: Provider Notinsystem   Principal Problem: Cardiopulmonary arrest with successful resuscitation    Subjective:     HPI:  Ms Ascencio is a 59 yo female with a PMH of CHF on diuretics at home, Seizure D/O, PE, chronic lumbar back pain on chronic narcotics,  Chronic constipation due to narcotics, MAICOL, DM, Gastroparesis, Gout and "double heart valve surgery".  According to records, Ms Ascencio was at home and called EMS due to acute onset of SOB.  EMS arrived to find patient in resp distress, diaphoretic, RA SAT 70%, glucose 24.  D50 IVP given and attempted CPAP.  CPAP unsuccessful and reportedly intubated on scene.  Patient had Bradycardia to PEA and ACLS started.  Received Epi X 2 but no Defib and arrived to [facility] ED with CPR in progress.  First pulse check in ED ROSC attained.  CL placed in ED and according to ED records re-intubated.  IV Lasix given then due to elevated LA > 12 given IVF bolus for sepsis protocol.  In ED serum CO2 8, creatine 2.1 (last creatine 1.3), AG 36, INR 1.8, Trop 0.07, BNP 2918, ABG 6.92/54/85/11 and CXR with diffuse patchy infiltrates and pleural effusions.  Daughter reported in ED patient stopped taking her diuretics 1 week ago due to severe gout.  She follows with Dr. Smith for Cards and at times has to get IV Lasix.  Daughter reports she has IVC filter from her previous PE but is not on home anticoagulants.  Also, she has been having increased weight gain, JUAREZ and chest congestion over last few days but no fever.  Her previous valve surgery was Aortic and Tricuspid done in 2012.    Hospital/ICU Course:  5/22 - Admitted to ICU intubated on mechanical ventilation unresponsive not on sedation but requiring Levophed infusion.  " Cyanosis of distal extremities noted.  CT head not done prior to arrival to ICU.   5/23 - continued decline overnight despite CRRT; on pressor x 3 on mechanical ventilation, pH down to 6.8 despite RRT and bicarb therapy    Review of Systems   Unable to perform ROS: Patient unresponsive         Objective:     Vital Signs (Most Recent):  Temp: (!) 94.4 °F (34.7 °C) (05/23/19 0505)  Pulse: 60 (05/23/19 0550)  Resp: (!) 29 (05/23/19 0548)  BP: (!) 112/23 (05/23/19 0505)  SpO2: (!) 72 % (05/23/19 0550) Vital Signs (24h Range):  Temp:  [91.2 °F (32.9 °C)-99 °F (37.2 °C)] 94.4 °F (34.7 °C)  Pulse:  [] 60  Resp:  [16-37] 29  SpO2:  [59 %-100 %] 72 %  BP: ()/(23-89) 112/23     Weight: 94.3 kg (207 lb 14.3 oz)  Body mass index is 33.55 kg/m².      Intake/Output Summary (Last 24 hours) at 5/23/2019 0708  Last data filed at 5/23/2019 0635  Gross per 24 hour   Intake 4999.2 ml   Output 16 ml   Net 4983.2 ml       Physical Exam   Constitutional: She appears toxic. She is intubated.   HENT:   Head: Atraumatic.   Eyes: Right conjunctiva is injected. Left conjunctiva is injected.   Large round equal and non reactive pupils  Eyes open with dry conjuctiva   Neck: No JVD present.   Cardiovascular: Exam reveals distant heart sounds.   Murmur heard.   Systolic murmur is present.   Diastolic murmur is present.  Distal Pulses doppler x 4 extremities   Pulmonary/Chest: She is intubated. She has decreased breath sounds. She has rhonchi. She has rales.   Abdominal: Soft. She exhibits no distension. Bowel sounds are decreased.   Skin: Capillary refill takes more than 3 seconds. There is cyanosis.        Severe generalized mottling and distal cyanosis       Vents:  Vent Mode: A/C (05/23/19 0550)  Ventilator Initiated: Yes (05/22/19 1342)  Set Rate: 30 bmp (05/23/19 0550)  Vt Set: 350 mL (05/23/19 0550)  Pressure Support: 0 cmH20 (05/23/19 0550)  PEEP/CPAP: 12 cmH20 (05/23/19 0550)  Oxygen Concentration (%): (S) 40 (05/23/19  0550)  Peak Airway Pressure: 32 cmH2O (05/23/19 0550)  Plateau Pressure: 0 cmH20 (05/23/19 0550)  Total Ve: 11.2 mL (05/23/19 0550)  F/VT Ratio<105 (RSBI): (!) 81.23 (05/23/19 0548)    Lines/Drains/Airways     Central Venous Catheter Line                 Percutaneous Central Line Insertion/Assessment - triple lumen  05/22/19 1540 left internal jugular less than 1 day          Drain                 NG/OG Tube 05/22/19 1248 14 Fr. Right mouth less than 1 day         Urethral Catheter 05/22/19 1905 Temperature probe less than 1 day          Airway                 Airway - Non-Surgical 05/22/19 1239 Endotracheal Tube less than 1 day          Arterial Line                 Arterial Line 05/22/19 1900 Right Radial less than 1 day          Peripheral Intravenous Line                 Peripheral IV - Single Lumen 05/22/19 1240 20 G Left Wrist less than 1 day         Peripheral IV - Single Lumen 05/22/19 1243 20 G Right Antecubital less than 1 day                Significant Labs:    CBC/Anemia Profile:  Recent Labs   Lab 05/22/19  1255 05/22/19  1328 05/23/19  0415   WBC 11.39  --  16.79*   HGB 14.2  --  12.6   HCT 47.5 45 43.9     --  84*   *  --  109*   RDW 15.8*  --  15.5*        Chemistries:  Recent Labs   Lab 05/22/19  1255  05/22/19  1940 05/23/19  0108 05/23/19  0415   *   < > 131* 133*  133*  133* 132*   K 4.0   < > 5.6* 4.2  4.2  4.2 4.3   CL 87*   < > 94* 97  97  97 97   CO2 8*   < > 5* 5*  5*  5* 7*   BUN 19   < > 18 14  14  14 13   CREATININE 2.1*   < > 2.4* 1.9*  1.9*  1.9* 1.9*   CALCIUM 9.1   < > 7.6* 7.9*  7.9*  7.9* 8.0*   ALBUMIN 3.2*  --   --  2.6* 2.5*   PROT 7.1  --   --  5.8* 5.5*   BILITOT 1.4*  --   --  2.1* 2.2*   ALKPHOS 234*  --   --  258* 286*   *  --   --  1,645* 1,769*   *  --   --  2,959* 3,503*   MG 1.6  --  3.1* 2.2  2.2 3.2*   PHOS  --   --  10.7* 8.1*  8.1* 8.1*    < > = values in this interval not displayed.       Coagulation:   Recent Labs    Lab 05/23/19  0415   INR 3.2*   APTT 104.5*     All pertinent labs within the past 24 hours have been reviewed.  ABG  Recent Labs   Lab 05/23/19  0531   PH 6.835*   PO2 175*   PCO2 40.8   HCO3 6.9*   BE -27         Significant Imaging:  I have reviewed all pertinent imaging results/findings within the past 24 hours.      ABG  Recent Labs   Lab 05/23/19  0531   PH 6.835*   PO2 175*   PCO2 40.8   HCO3 6.9*   BE -27     Assessment/Plan:     Neuro  Hypoxic encephalopathy  Reportedly did receive some Rocuronium for unknown reason may be cause of comatose state  Due to Cardiac arrest will start TTM if CT head unremarkable for ICH  ICU neuro monitoring  5/23 - TTM stopped due to hemodynamic instability; neuro status complicated by severe prolonged acidosis    Seizure disorder  continue Keppra  Monitor for seizure activity    Pulmonary  Acute respiratory failure with hypoxia and hypercapnia  Cont full vent support  VAP prophylaxis  Vent settings reviewed and adjusted multiple times  ICU Pulm Monitoring and support  5/23 - vent settings adjusted for optimal gas exchange      Pulmonary infiltrates on CXR  Suspect Pulm Edema and doubt PNA  Cont Zosyn for now  Stop Vanc  Blood cultures X 2 pending  Check sputum culture  Daily CXR  5/22 - no significant radiologic change, continue empiric therapy    Cardiac/Vascular  * Cardiopulmonary arrest with successful resuscitation  Cont ICU hemodynamic monitoring and support on Levophed infusion  5/23 - TTM stopped due to instability; continue aggressive hemodynamic support; avoid aggressive warming or hyperthermia      Hx of double heart valve surgery  Hx of Aortic and Tricuspid valve replacement in 2012 per daughter  Starting Heparin infusion  Echo pending  5/23 - heparin stopped due to hypercoagulability; echo reviewed, cardiology following and per eICU considering benefit to IABP support    Acute on chronic systolic heart failure  Review of pharmacy meds reveal she has not been  filling her Lasix, Spirinolactone and Metolazone meds at home pharmacy  Due to shock unable to diurese aggresively  Will start CRRT  5/23-  RRT stopped due to instability; resume if hemodynamics permit    Renal/  Metabolic acidosis  Consulted Renal  Will attempt placement of Trialysis catheter and CRRT asap  Serial BMPs  5/23 - continue bicarb infusion; vent settings adjusted; poor prognosis for recovery with refractory severe acidosis    SHEREE (acute kidney injury)  Renal consulted  Planning CRRT  5/23 - RRT stopped due to instability, resume if hemodynamics improve; nephrology following    Hematology  History of pulmonary embolism  Possible cause of cardiac arrest  Daughter reports patient has IVC filter  Check LE doppler US and cardiac Echo  Start Heparin infusion  If Echo shows RV strain will need to consider CTA chest to rule out PE even given SHEREE  1/23 - elevated PA pressures on echo but no evidence of acute rt heart strain and LE US negative, doubt benefit to CTA / TPA    Endocrine  Type 2 diabetes mellitus with hypoglycemia, without long-term current use of insulin  Hypoglycemia overnight likely s/t acute liver injury  Continue monitoring with prn dextrose supplementation    GI  Chronic constipation  Miralax and PRN Dulcolax    Shock liver  Support BP and repeat LFTs with CMP in AM  5/23 - worsening s/t continued shock state, optimize hemodynamics as able and monitor    Orthopedic  Chronic midline low back pain w/ chronic narcotic use  Holding any narcotics for neuro assessment  On home Lyrica and Oxy on review of home meds       Critical Care Daily Checklist:    A: Awake: RASS Goal/Actual Goal: RASS Goal: -4-->deep sedation  Actual: Buckner Agitation Sedation Scale (RASS): Unarousable   B: Spontaneous Breathing Trial Performed?     C: SAT & SBT Coordinated?  no                      D: Delirium: CAM-ICU Overall CAM-ICU: Positive   E: Early Mobility Performed? ROM   F: Feeding Goal:    Status:     Current  Diet Order   Procedures    Diet NPO      AS: Analgesia/Sedation none   T: Thromboembolic Prophylaxis hypercoaguable   H: HOB > 300 Yes   U: Stress Ulcer Prophylaxis (if needed) pepcid   G: Glucose Control monitoring   B: Bowel Function     I: Indwelling Catheter (Lines & Russell) Necessity reviewed   D: De-escalation of Antimicrobials/Pharmacotherapies reviwed    Plan for the day/ETD As above    Code Status:  Family/Goals of Care: Full Code  Pending additional family updates, recommend DNR given ongoing instability despite 3 pressor support   I have discussed case and plan of care in detail with Dr Barr and Dr Curry; Status and plan of care were discussed with team on multidisciplinary rounds.    Critical Care Time: 75 minutes  Critical secondary to multi organ failure on multi organ support; Critical care was time spent personally by me on the following activities: development of treatment plan with patient or surrogate and bedside caregivers, discussions with consultants, evaluation of patient's response to treatment, examination of patient, ordering and performing treatments and interventions, ordering and review of laboratory studies, ordering and review of radiographic studies, pulse oximetry, re-evaluation of patient's condition. This critical care time did not overlap with that of any other provider or involve time for any procedures.     MAYRA Kaufman-BC  Critical Care Medicine  [facility]

## 2019-05-23 NOTE — SUBJECTIVE & OBJECTIVE
Review of Systems   Unable to perform ROS: Patient unresponsive         Objective:     Vital Signs (Most Recent):  Temp: (!) 94.4 °F (34.7 °C) (05/23/19 0505)  Pulse: 60 (05/23/19 0550)  Resp: (!) 29 (05/23/19 0548)  BP: (!) 112/23 (05/23/19 0505)  SpO2: (!) 72 % (05/23/19 0550) Vital Signs (24h Range):  Temp:  [91.2 °F (32.9 °C)-99 °F (37.2 °C)] 94.4 °F (34.7 °C)  Pulse:  [] 60  Resp:  [16-37] 29  SpO2:  [59 %-100 %] 72 %  BP: ()/(23-89) 112/23     Weight: 94.3 kg (207 lb 14.3 oz)  Body mass index is 33.55 kg/m².      Intake/Output Summary (Last 24 hours) at 5/23/2019 0708  Last data filed at 5/23/2019 0635  Gross per 24 hour   Intake 4999.2 ml   Output 16 ml   Net 4983.2 ml       Physical Exam   Constitutional: She appears toxic. She is intubated.   HENT:   Head: Atraumatic.   Eyes: Right conjunctiva is injected. Left conjunctiva is injected.   Large round equal and non reactive pupils  Eyes open with dry conjuctiva   Neck: No JVD present.   Cardiovascular: Exam reveals distant heart sounds.   Murmur heard.   Systolic murmur is present.   Diastolic murmur is present.  Distal Pulses doppler x 4 extremities   Pulmonary/Chest: She is intubated. She has decreased breath sounds. She has rhonchi. She has rales.   Abdominal: Soft. She exhibits no distension. Bowel sounds are decreased.   Skin: Capillary refill takes more than 3 seconds. There is cyanosis.        Severe generalized mottling and distal cyanosis       Vents:  Vent Mode: A/C (05/23/19 0550)  Ventilator Initiated: Yes (05/22/19 1342)  Set Rate: 30 bmp (05/23/19 0550)  Vt Set: 350 mL (05/23/19 0550)  Pressure Support: 0 cmH20 (05/23/19 0550)  PEEP/CPAP: 12 cmH20 (05/23/19 0550)  Oxygen Concentration (%): (S) 40 (05/23/19 0550)  Peak Airway Pressure: 32 cmH2O (05/23/19 0550)  Plateau Pressure: 0 cmH20 (05/23/19 0550)  Total Ve: 11.2 mL (05/23/19 0550)  F/VT Ratio<105 (RSBI): (!) 81.23 (05/23/19 0548)    Lines/Drains/Airways     Central Venous  Catheter Line                 Percutaneous Central Line Insertion/Assessment - triple lumen  05/22/19 1540 left internal jugular less than 1 day          Drain                 NG/OG Tube 05/22/19 1248 14 Fr. Right mouth less than 1 day         Urethral Catheter 05/22/19 1905 Temperature probe less than 1 day          Airway                 Airway - Non-Surgical 05/22/19 1239 Endotracheal Tube less than 1 day          Arterial Line                 Arterial Line 05/22/19 1900 Right Radial less than 1 day          Peripheral Intravenous Line                 Peripheral IV - Single Lumen 05/22/19 1240 20 G Left Wrist less than 1 day         Peripheral IV - Single Lumen 05/22/19 1243 20 G Right Antecubital less than 1 day                Significant Labs:    CBC/Anemia Profile:  Recent Labs   Lab 05/22/19  1255 05/22/19  1328 05/23/19  0415   WBC 11.39  --  16.79*   HGB 14.2  --  12.6   HCT 47.5 45 43.9     --  84*   *  --  109*   RDW 15.8*  --  15.5*        Chemistries:  Recent Labs   Lab 05/22/19  1255  05/22/19  1940 05/23/19  0108 05/23/19  0415   *   < > 131* 133*  133*  133* 132*   K 4.0   < > 5.6* 4.2  4.2  4.2 4.3   CL 87*   < > 94* 97  97  97 97   CO2 8*   < > 5* 5*  5*  5* 7*   BUN 19   < > 18 14  14  14 13   CREATININE 2.1*   < > 2.4* 1.9*  1.9*  1.9* 1.9*   CALCIUM 9.1   < > 7.6* 7.9*  7.9*  7.9* 8.0*   ALBUMIN 3.2*  --   --  2.6* 2.5*   PROT 7.1  --   --  5.8* 5.5*   BILITOT 1.4*  --   --  2.1* 2.2*   ALKPHOS 234*  --   --  258* 286*   *  --   --  1,645* 1,769*   *  --   --  2,959* 3,503*   MG 1.6  --  3.1* 2.2  2.2 3.2*   PHOS  --   --  10.7* 8.1*  8.1* 8.1*    < > = values in this interval not displayed.       Coagulation:   Recent Labs   Lab 05/23/19  0415   INR 3.2*   APTT 104.5*     All pertinent labs within the past 24 hours have been reviewed.  ABG  Recent Labs   Lab 05/23/19  0531   PH 6.835*   PO2 175*   PCO2 40.8   HCO3 6.9*   BE -27          Significant Imaging:  I have reviewed all pertinent imaging results/findings within the past 24 hours.

## 2019-05-23 NOTE — PROCEDURES
"Gurvinder Rankin is a 58 y.o. female patient.    Temp: 97.6 °F (36.4 °C) (05/22/19 1645)  Pulse: 75 (05/22/19 1838)  Resp: (!) 30 (05/22/19 1838)  BP: (!) 77/44 (05/22/19 1745)  SpO2: (!) 88 % (05/22/19 1838)  Weight: 89.6 kg (197 lb 9.6 oz) (05/22/19 1300)  Height: 5' 6" (167.6 cm) (05/22/19 1300)       Central Line  Date/Time: 5/22/2019 6:30 PM  Location procedure was performed: [facility] INTENSIVE CARE UNIT  Performed by: Theodore Michel NP  Pre-operative Diagnosis: Met Acidosis  Post-operative diagnosis: Met Acidosis  Consent Done: Yes  Time out: Immediately prior to procedure a "time out" was called to verify the correct patient, procedure, equipment, support staff and site/side marked as required.  Indications: hemodialysis  Preparation: skin prepped with ChloraPrep  Skin prep agent dried: skin prep agent completely dried prior to procedure  Sterile barriers: all five maximum sterile barriers used - cap, mask, sterile gown, sterile gloves, and large sterile sheet  Hand hygiene: hand hygiene performed prior to central venous catheter insertion  Location details: right femoral  Site selection rationale: emergent  Catheter type: trialysis  Catheter size: 12 Fr  Catheter Length: 20cm    Ultrasound guidance: yes  Vessel Caliber: medium, patent, compressibility normal  Vascular Doppler: not done  Needle advanced into vessel with real time Ultrasound guidance.  Guidewire confirmed in vessel.  Sterile sheath used.  Manometry: No   Number of attempts: 1  Assessment: successful placement  Complications: none  Estimated blood loss (mL): 1  Specimens: No  Implants: No  Post-procedure: line sutured,  chlorhexidine patch,  sterile dressing applied and blood return through all ports  Complications: No          Theodore Michel  5/22/2019  "

## 2019-05-23 NOTE — ASSESSMENT & PLAN NOTE
Suspect Pulm Edema and doubt PNA  Cont Zosyn for now  Stop Vanc  Blood cultures X 2 pending  Check sputum culture  Daily CXR  5/22 - no significant radiologic change, continue empiric therapy

## 2019-05-23 NOTE — PROGRESS NOTES
pts heart rate down to 0 on the monitor.  Pt has no pulse.  Placed a call to DR. Valle to notify.  Family at bedside.       Dr. Valle at bedside.  Pt pronounced dead at 1345.  Dr. Valle speaking with pts family.      Placed a call to 's offce of [facility].  Awaiting call back.

## 2019-05-23 NOTE — CONSULTS
[facility] -   Cardiology  Consult Note    Patient Name: Gurvinder Rankin  MRN: [id number]  Admission Date: 5/22/2019  Hospital Length of Stay: 1 days  Code Status: DNR   Attending Provider: Aristides Curry MD   Consulting Provider: MICHELLE Navarro  Primary Care Physician: Provider Notinsystem  Principal Problem:Cardiopulmonary arrest with successful resuscitation    Patient information was obtained from spouse/SO and ER records.     Inpatient consult to Cardiology  Consult performed by: MICHELLE Lloyd  Consult ordered by: Aristides Curry MD  Reason for consult: post cardiac arrest         Subjective:     Chief Complaint:  Cardiac arrest      HPI:   Ms. Rankin is a 59 y/o female who presented to the ED post cardiac arrest with ROSC. EMS arrived to patient's home and she was hypoxic and hypoglycemic. BS 24 O2 sats in 70's. Patient was intubated in the field. Patient bradycardic resulting in PEA. CPR initiated with a ROSC. Patient initiated on pressors, central line placed. LA >12. Current admitted with cardiogenic shock with EF 20%, started on CRRT, but had to stop due to hypotension. Remains acidotic. Requiring multiple pressors for BP support. Family has expressed wishes to make patient DNR.       Past Medical History:   Diagnosis Date    CHF (congestive heart failure)     Chronic back pain     Constipation due to pain medication     Diabetes mellitus     Epilepsy     MAICOL (generalized anxiety disorder)     Gout     Pulmonary embolus        Past Surgical History:   Procedure Laterality Date    BRAIN SURGERY      Doubel heart valve replacement      JOINT REPLACEMENT      lumbar back surgery 2001      TOTAL KNEE ARTHROPLASTY      Left       Review of patient's allergies indicates:   Allergen Reactions    Acetaminophen Swelling       No current facility-administered medications on file prior to encounter.      Current Outpatient Medications on File Prior to Encounter   Medication  Sig    allopurinol (ZYLOPRIM) 300 MG tablet Take 300 mg by mouth once daily.    atorvastatin (LIPITOR) 20 MG tablet Take 20 mg by mouth once daily.    colchicine (COLCRYS) 0.6 mg tablet Take 0.6 mg by mouth once daily.    diltiaZEM (CARDIZEM) 60 MG tablet Take 60 mg by mouth Daily.    divalproex (DEPAKOTE SPRINKLE) 125 mg capsule Take 250 mg by mouth 3 (three) times daily.    furosemide (LASIX) 80 MG tablet Take 80 mg by mouth 2 (two) times daily.    linaclotide (LINZESS) 290 mcg Cap Take 290 mcg by mouth once daily.    metFORMIN (GLUCOPHAGE) 500 MG tablet Take 500 mg by mouth 2 (two) times daily with meals.    metOLazone (ZAROXOLYN) 5 MG tablet Take 5 mg by mouth once daily.    oxyCODONE (ROXICODONE) 15 MG Tab Take 15 mg by mouth 4 (four) times daily.    pantoprazole (PROTONIX) 20 MG tablet Take 20 mg by mouth 2 (two) times daily.    potassium chloride (KLOR-CON) 20 mEq Pack Take 20 mEq by mouth 4 (four) times daily.    pregabalin (LYRICA) 200 MG Cap Take 200 mg by mouth 3 (three) times daily.    spironolactone (ALDACTONE) 50 MG tablet Take 50 mg by mouth 2 (two) times daily.    venlafaxine (EFFEXOR-XR) 150 MG Cp24 Take 150 mg by mouth 2 (two) times daily.    zolpidem (AMBIEN) 10 mg Tab Take 5 mg by mouth nightly as needed.     Family History     Problem Relation (Age of Onset)    Hypertension Mother        Tobacco Use    Smoking status: Former Smoker   Substance and Sexual Activity    Alcohol use: Not Currently     Frequency: Never    Drug use: Never    Sexual activity: Not Currently     Review of Systems   Reason unable to perform ROS: intubated and sedated      Objective:     Vital Signs (Most Recent):  Temp: (!) 94.3 °F (34.6 °C) (05/23/19 1100)  Pulse: 70 (05/23/19 1100)  Resp: (!) 30 (05/23/19 1150)  BP: (!) 63/56 (05/23/19 1100)  SpO2: 96 % (05/23/19 0830) Vital Signs (24h Range):  Temp:  [91.2 °F (32.9 °C)-99 °F (37.2 °C)] 94.3 °F (34.6 °C)  Pulse:  [] 70  Resp:  [16-37] 30  SpO2:   [59 %-100 %] 96 %  BP: ()/(23-89) 63/56     Weight: 94.3 kg (207 lb 14.3 oz)  Body mass index is 33.55 kg/m².    SpO2: 96 %  O2 Device (Oxygen Therapy): ventilator      Intake/Output Summary (Last 24 hours) at 5/23/2019 1159  Last data filed at 5/23/2019 0635  Gross per 24 hour   Intake 4999.2 ml   Output 995 ml   Net 4004.2 ml       Lines/Drains/Airways     Central Venous Catheter Line                 Percutaneous Central Line Insertion/Assessment - triple lumen  05/22/19 1540 left internal jugular less than 1 day          Drain                 NG/OG Tube 05/22/19 1248 14 Fr. Right mouth less than 1 day         Urethral Catheter 05/22/19 1905 Temperature probe less than 1 day          Airway                 Airway - Non-Surgical 05/22/19 1239 Endotracheal Tube less than 1 day          Arterial Line                 Arterial Line 05/22/19 1900 Right Radial less than 1 day          Peripheral Intravenous Line                 Peripheral IV - Single Lumen 05/22/19 1240 20 G Left Wrist less than 1 day         Peripheral IV - Single Lumen 05/22/19 1243 20 G Right Antecubital less than 1 day                Physical Exam   Constitutional: She appears well-developed and well-nourished. She appears toxic. She does not have a sickly appearance. She is sedated and intubated.   Neck: Neck supple. No JVD present.   Cardiovascular: Normal rate, regular rhythm, normal heart sounds and normal pulses. Exam reveals no friction rub.   No murmur heard.  Pulmonary/Chest: Effort normal. She is intubated. No respiratory distress. She has no wheezes. She has rhonchi. She has no rales.   Abdominal: Soft. Bowel sounds are normal. She exhibits no distension.   Musculoskeletal: She exhibits no edema or tenderness.   Neurological:   Intubated and sedated    Skin: Skin is warm and dry. No rash noted.   Mottled LE    Psychiatric: She has a normal mood and affect. Her behavior is normal.   Nursing note and vitals reviewed.      Significant  Labs:   Blood Culture:   Recent Labs   Lab 05/22/19  1255 05/22/19  1258   LABBLOO No Growth to date No Growth to date   , CMP   Recent Labs   Lab 05/22/19  1255  05/23/19  0108 05/23/19  0415 05/23/19  0720   *   < > 133*  133*  133* 132* 132*   K 4.0   < > 4.2  4.2  4.2 4.3 4.6   CL 87*   < > 97  97  97 97 96   CO2 8*   < > 5*  5*  5* 7* 8*   *   < > 67*  67*  67* 90 182*   BUN 19   < > 14  14  14 13 13   CREATININE 2.1*   < > 1.9*  1.9*  1.9* 1.9* 2.2*   CALCIUM 9.1   < > 7.9*  7.9*  7.9* 8.0* 7.0*   PROT 7.1  --  5.8* 5.5*  --    ALBUMIN 3.2*  --  2.6* 2.5*  --    BILITOT 1.4*  --  2.1* 2.2*  --    ALKPHOS 234*  --  258* 286*  --    *  --  2,959* 3,503*  --    *  --  1,645* 1,769*  --    ANIONGAP 36*   < > 31*  31*  31* 28* 28*   ESTGFRAFRICA 30*   < > 33*  33*  33* 33* 28*   EGFRNONAA 26*   < > 29*  29*  29* 29* 24*    < > = values in this interval not displayed.   , CBC   Recent Labs   Lab 05/22/19  1255  05/23/19  0415 05/23/19  0731   WBC 11.39  --  16.79*  --    HGB 14.2  --  12.6  --    HCT 47.5   < > 43.9 36     --  84*  --     < > = values in this interval not displayed.   , Troponin   Recent Labs   Lab 05/22/19  1255 05/22/19  1722   TROPONINI 0.071* 0.436*    and All pertinent lab results from the last 24 hours have been reviewed.    Significant Imaging: Echocardiogram:   2D echo with color flow doppler:   Results for orders placed or performed during the hospital encounter of 05/22/19   2D echo with color flow doppler   Result Value Ref Range    QEF 20 (A) 55 - 65    Aortic Valve Regurgitation MILD TO MODERATE (A)     Est. PA Systolic Pressure 69.22 (A)     Pericardial Effusion SMALL (A)     Mitral Valve Mobility MARKEDLY RESTRICTED     Mitral Valve Stenosis SEVERE (A)     Tricuspid Valve Regurgitation MODERATE TO SEVERE (A)      Assessment and Plan:     Cardiogenic shock  Recommend continuing current aggressive supportive care   Patient BP not  responding to multiple pressors   Unable to tolerate CRRT at this time, so remains acidotic   Echo shows LVEF 20%  Cardiology recommends conservative medical management at this time.   Will be available as needed           VTE Risk Mitigation (From admission, onward)        Ordered     IP VTE HIGH RISK PATIENT  Once      05/22/19 1869          Thank you for your consult. Will be available as needed.  Please contact us if you have any additional questions.    Pearl Rendon, MICHELLE  Cardiology   [facility]

## 2019-05-23 NOTE — PROGRESS NOTES
Patient arrived to room via stretcher, portable vent and levo ivf and vanc infusing from ED accompanied by nurse. Transferred to bed.  Heart monitor in place, vital signs taken and stable.  Bed in lowest position and locked in place.  Patient is unresponsive. Pupils dilated but reactive.  No cough, gag or corneal present.  Pt is pale with extremities cool, feet already mottled and toes and heel purple.  Unable to get doppler pedal pulses, post tib pulses doppler.  NP at bedside aware of pt status.  New labs drawn and sent. NP updated pt daughter.

## 2019-05-23 NOTE — ASSESSMENT & PLAN NOTE
Hx of Aortic and Tricuspid valve replacement in 2012 per daughter  Starting Heparin infusion  Echo pending  5/23 - heparin stopped due to hypercoagulability; echo reviewed, cardiology following and per eICU considering benefit to IABP support

## 2019-05-23 NOTE — PLAN OF CARE
CM went to bedside to complete bedside discharge planning assessment.  Patient intubated at this time.  CM contacted patient's , Theodore, to complete assessment.  Patient was living at home with her .  Theodore states that patient was wheelchair bound prior to admission.  Theodore is unsure of needs at this time as patient is intubated.  CM will continue to follow.  CM provided a transitional care folder, information on advanced directives, information on pharmacy bedside delivery, and discharge planning begins on admission with contact information for any needs/questions.    D/C Plan: Unsure  Pharmacy Bedside Delivery: Unsure     05/23/19 0953   Discharge Assessment   Assessment Type Discharge Planning Assessment   Confirmed/corrected address and phone number on facesheet? Yes   Assessment information obtained from? Caregiver;Medical Record   Expected Length of Stay (days)   (TBD)   Prior to hospitalization functional status: Wheelchair Bound   Current cognitive status: Coma/Sedated/Intubated   Current Functional Status: Completely Dependent   Facility Arrived From: home   Lives With spouse   Is patient able to care for self after discharge? Unable to determine at this time (comments)   Who are your caregiver(s) and their phone number(s)? Theodore Rankin, spouse [phone]   Patient's perception of discharge disposition   (Unsure at this time)   Readmission Within the Last 30 Days no previous admission in last 30 days   Patient currently being followed by outpatient case management? No   Patient currently receives any other outside agency services? No   Equipment Currently Used at Home wheelchair   Dialysis Name and Scheduled days NA   Patient/Family in Agreement with Plan yes

## 2019-05-23 NOTE — ASSESSMENT & PLAN NOTE
Hypoglycemia overnight likely s/t acute liver injury  Continue monitoring with prn dextrose supplementation

## 2019-05-23 NOTE — PROGRESS NOTES
[facility] -   Nephrology  Progress Note    Patient Name: Gurvinder Rankin  MRN: [id number]  Admission Date: 5/22/2019  Hospital Length of Stay: 1 days  Attending Provider: Aristides Curry MD   Primary Care Physician: Provider Notinsystem  Principal Problem:Cardiopulmonary arrest with successful resuscitation    Subjective:     HPI: Pt was seen and examined in ICU. H/o and chart reviewed. Pt is a 57 y/o female s/p cardiac arrest. Pt is critically ill with cardiogenic shock and severe metabolic acidosis (PH 6.9). Pt has signifiacnt cardiac issues including severe systolic CHF with EF 20% and h/o of AVR. Admit notes reviewed. Noted pt was hypoxic and bradycardic in the filed and developed PEA. Labs, meds, mgmt reviewed in details.    Interval History: Pt was seen and examined in ICU. Met with pt's  and updated him on the medical problems. Discussed dialysis treatment with ICU nurse at 2 am by phone. Pt remained on CRRT overnight. Labs and management reviewed today.     Review of patient's allergies indicates:   Allergen Reactions    Acetaminophen Swelling     Current Facility-Administered Medications   Medication Frequency    0.9%  NaCl infusion (CRRT USE ONLY) Continuous    albuterol-ipratropium 2.5 mg-0.5 mg/3 mL nebulizer solution 3 mL Q6H WAKE    bisacodyl suppository 10 mg Daily PRN    chlorhexidine 0.12 % solution 15 mL BID    dextrose 50% injection 12.5 g PRN    DOPamine 400 mg in dextrose 5 % 250 mL infusion (premix) Continuous    famotidine (PF) injection 20 mg Daily    glucagon (human recombinant) injection 1 mg PRN    glucose chewable tablet 16 g PRN    glucose chewable tablet 24 g PRN    heparin 25,000 units in dextrose 5% (100 units/ml) IV bolus from bag - ADDITIONAL PRN BOLUS - 30 units/kg PRN    heparin 25,000 units in dextrose 5% (100 units/ml) IV bolus from bag - ADDITIONAL PRN BOLUS - 60 units/kg PRN    heparin 25,000 units in dextrose 5% 250 mL (100 units/mL) infusion  HIGH INTENSITY nomogram - [facility] Continuous    hydrocortisone sodium succinate injection 100 mg Q8H    ibuprofen tablet 400 mg Q6H PRN    insulin aspart U-100 pen 0-5 Units Q6H PRN    levETIRAcetam in NaCl (iso-os) IVPB 500 mg Q12H    magnesium sulfate 2g in water 50mL IVPB (premix) PRN    magnesium sulfate 2g in water 50mL IVPB (premix) PRN    magnesium sulfate 2g in water 50mL IVPB (premix) PRN    norepinephrine 32 mg in dextrose 5 % 250 mL infusion Continuous    ondansetron injection 4 mg Q8H PRN    piperacillin-tazobactam 4.5 g in dextrose 5 % 100 mL IVPB (ready to mix system) Q8H    polyethylene glycol packet 17 g Daily    potassium chloride 40 mEq in 100 mL IVPB (FOR CENTRAL LINE ADMINISTRATION ONLY) PRN    And    potassium chloride 20 mEq in 100 mL IVPB (FOR CENTRAL LINE ADMINISTRATION ONLY) PRN    And    potassium chloride 40 mEq in 100 mL IVPB (FOR CENTRAL LINE ADMINISTRATION ONLY) PRN    sodium bicarbonate 1 mEq/mL (8.4 %) 150 mEq in dextrose 5 % 1,150 mL infusion Continuous    sodium chloride 0.9% flush 10 mL PRN    sodium phosphate 20.01 mmol in dextrose 5 % 250 mL IVPB PRN    sodium phosphate 30 mmol in dextrose 5 % 250 mL IVPB PRN    sodium phosphate 39.99 mmol in dextrose 5 % 250 mL IVPB PRN    vasopressin (PITRESSIN) 0.2 Units/mL in dextrose 5 % 100 mL infusion Continuous    white petrolatum-mineral oil (LUBIFRESH P.M.) ophthalmic ointment BID       Objective:     Vital Signs (Most Recent):  Temp: (!) 94.3 °F (34.6 °C) (05/23/19 0715)  Pulse: 68 (05/23/19 1012)  Resp: (!) 30 (05/23/19 1012)  BP: (!) 86/69 (05/23/19 0800)  SpO2: 96 % (05/23/19 0830)  O2 Device (Oxygen Therapy): ventilator (05/23/19 1012) Vital Signs (24h Range):  Temp:  [91.2 °F (32.9 °C)-99 °F (37.2 °C)] 94.3 °F (34.6 °C)  Pulse:  [] 68  Resp:  [16-37] 30  SpO2:  [59 %-100 %] 96 %  BP: ()/(23-89) 86/69     Weight: 94.3 kg (207 lb 14.3 oz) (05/23/19 0538)  Body mass index is 33.55 kg/m².  Body surface  area is 2.1 meters squared.    I/O last 3 completed shifts:  In: 4999.2 [I.V.:2099.2; NG/GT:300; IV Piggyback:2600]  Out: 995 [Urine:16; Other:979]    Physical Exam   Constitutional: She appears well-developed and well-nourished.   HENT:   Head: Normocephalic and atraumatic.   Neck: No JVD present.   Cardiovascular: Normal rate and regular rhythm. Exam reveals no friction rub.   Pulmonary/Chest: She has rales.   intubated   Abdominal: Soft.   Musculoskeletal: She exhibits no edema.   Skin: Skin is warm.   Nursing note and vitals reviewed.      Significant Labs: reviewed  BMP  Lab Results   Component Value Date     (L) 05/23/2019    K 4.6 05/23/2019    CL 96 05/23/2019    CO2 8 (LL) 05/23/2019    BUN 13 05/23/2019    CREATININE 2.2 (H) 05/23/2019    CALCIUM 7.0 (L) 05/23/2019    ANIONGAP 28 (H) 05/23/2019    ESTGFRAFRICA 28 (A) 05/23/2019    EGFRNONAA 24 (A) 05/23/2019     Lab Results   Component Value Date    WBC 16.79 (H) 05/23/2019    HGB 12.6 05/23/2019    HCT 36 05/23/2019     (H) 05/23/2019    PLT 84 (L) 05/23/2019     ABG  Recent Labs   Lab 05/23/19  0731   PH 6.923*   PO2 117*   PCO2 36.5   HCO3 7.5*   BE -25         Significant Imaging: reviewed    Assessment/Plan:     59 y/o female with life threatening, severe metabolic acidosis post cardiac arrest:     Metabolic acidosis  Severe metabolic acidosis  Providing dialysis continuously does not appear to be helping  Pt is critically ill and is not responding to the available treatment/RRT  CRRT appears to be futile  Lactic acidosis/cardiogenic shock  Hypotension  hyponatremia  K is normal  Cardiogenic shock  Acute kidney injury     * Cardiopulmonary arrest with successful resuscitation  Had cardiac arrest at home  Significant h/o of multiple cardiac issues  Severe systolic CHF  Pulmonary HTN  H/o of PE  CAD  Noted young age  Discussed with ICU nurse. Drips, meds, IVF's reviewed and managed with ICU nurse.        ID: pulmonary infiltrates  Likely  has pneumonia  May have aspirated  Abx reviewed           Plans and recommendations:  As discussed above  Discussed on rounds today with ICU team  Recommend stop dialysis, CRRT is futile, not helping, pt is beyond the point of return  Total critical care time spent 50 minutes including time needed to review the records, the   patient evaluation, documentation, face-to-face discussion with the patient's hudband, phone call and mgmt of medical issues at 2 am.  Multiple medical issues addressed. Medical care provided was in addition to providing dialysis, as documented. Additional charge will apply  Pt received multiple visits and evaluations in ICU.        Daniel Ramos MD  Nephrology  [facility]

## 2019-05-23 NOTE — NURSING
Noticed pt moving in bed. Pt opened eyes and moved feet to command. eICU notified. TTM stopped due to pt being awake. Daughter informed at bedside.

## 2019-05-23 NOTE — SUBJECTIVE & OBJECTIVE
Past Medical History:   Diagnosis Date    CHF (congestive heart failure)     Chronic back pain     Constipation due to pain medication     Diabetes mellitus     Epilepsy     MAICOL (generalized anxiety disorder)     Gout     Pulmonary embolus        Past Surgical History:   Procedure Laterality Date    BRAIN SURGERY      Doubel heart valve replacement      JOINT REPLACEMENT      lumbar back surgery 2001      TOTAL KNEE ARTHROPLASTY      Left       Review of patient's allergies indicates:   Allergen Reactions    Acetaminophen Swelling       No current facility-administered medications on file prior to encounter.      Current Outpatient Medications on File Prior to Encounter   Medication Sig    allopurinol (ZYLOPRIM) 300 MG tablet Take 300 mg by mouth once daily.    atorvastatin (LIPITOR) 20 MG tablet Take 20 mg by mouth once daily.    colchicine (COLCRYS) 0.6 mg tablet Take 0.6 mg by mouth once daily.    diltiaZEM (CARDIZEM) 60 MG tablet Take 60 mg by mouth Daily.    divalproex (DEPAKOTE SPRINKLE) 125 mg capsule Take 250 mg by mouth 3 (three) times daily.    furosemide (LASIX) 80 MG tablet Take 80 mg by mouth 2 (two) times daily.    linaclotide (LINZESS) 290 mcg Cap Take 290 mcg by mouth once daily.    metFORMIN (GLUCOPHAGE) 500 MG tablet Take 500 mg by mouth 2 (two) times daily with meals.    metOLazone (ZAROXOLYN) 5 MG tablet Take 5 mg by mouth once daily.    oxyCODONE (ROXICODONE) 15 MG Tab Take 15 mg by mouth 4 (four) times daily.    pantoprazole (PROTONIX) 20 MG tablet Take 20 mg by mouth 2 (two) times daily.    potassium chloride (KLOR-CON) 20 mEq Pack Take 20 mEq by mouth 4 (four) times daily.    pregabalin (LYRICA) 200 MG Cap Take 200 mg by mouth 3 (three) times daily.    spironolactone (ALDACTONE) 50 MG tablet Take 50 mg by mouth 2 (two) times daily.    venlafaxine (EFFEXOR-XR) 150 MG Cp24 Take 150 mg by mouth 2 (two) times daily.    zolpidem (AMBIEN) 10 mg Tab Take 5 mg by mouth  nightly as needed.     Family History     Problem Relation (Age of Onset)    Hypertension Mother        Tobacco Use    Smoking status: Former Smoker   Substance and Sexual Activity    Alcohol use: Not Currently     Frequency: Never    Drug use: Never    Sexual activity: Not Currently     Review of Systems   Reason unable to perform ROS: intubated and sedated      Objective:     Vital Signs (Most Recent):  Temp: (!) 94.3 °F (34.6 °C) (05/23/19 1100)  Pulse: 70 (05/23/19 1100)  Resp: (!) 30 (05/23/19 1150)  BP: (!) 63/56 (05/23/19 1100)  SpO2: 96 % (05/23/19 0830) Vital Signs (24h Range):  Temp:  [91.2 °F (32.9 °C)-99 °F (37.2 °C)] 94.3 °F (34.6 °C)  Pulse:  [] 70  Resp:  [16-37] 30  SpO2:  [59 %-100 %] 96 %  BP: ()/(23-89) 63/56     Weight: 94.3 kg (207 lb 14.3 oz)  Body mass index is 33.55 kg/m².    SpO2: 96 %  O2 Device (Oxygen Therapy): ventilator      Intake/Output Summary (Last 24 hours) at 5/23/2019 1159  Last data filed at 5/23/2019 0635  Gross per 24 hour   Intake 4999.2 ml   Output 995 ml   Net 4004.2 ml       Lines/Drains/Airways     Central Venous Catheter Line                 Percutaneous Central Line Insertion/Assessment - triple lumen  05/22/19 1540 left internal jugular less than 1 day          Drain                 NG/OG Tube 05/22/19 1248 14 Fr. Right mouth less than 1 day         Urethral Catheter 05/22/19 1905 Temperature probe less than 1 day          Airway                 Airway - Non-Surgical 05/22/19 1239 Endotracheal Tube less than 1 day          Arterial Line                 Arterial Line 05/22/19 1900 Right Radial less than 1 day          Peripheral Intravenous Line                 Peripheral IV - Single Lumen 05/22/19 1240 20 G Left Wrist less than 1 day         Peripheral IV - Single Lumen 05/22/19 1243 20 G Right Antecubital less than 1 day                Physical Exam   Constitutional: She appears well-developed and well-nourished. She appears toxic. She does not have a  sickly appearance. She is sedated and intubated.   Neck: Neck supple. No JVD present.   Cardiovascular: Normal rate, regular rhythm, normal heart sounds and normal pulses. Exam reveals no friction rub.   No murmur heard.  Pulmonary/Chest: Effort normal. She is intubated. No respiratory distress. She has no wheezes. She has rhonchi. She has no rales.   Abdominal: Soft. Bowel sounds are normal. She exhibits no distension.   Musculoskeletal: She exhibits no edema or tenderness.   Neurological:   Intubated and sedated    Skin: Skin is warm and dry. No rash noted.   Mottled LE    Psychiatric: She has a normal mood and affect. Her behavior is normal.   Nursing note and vitals reviewed.      Significant Labs:   Blood Culture:   Recent Labs   Lab 05/22/19  1255 05/22/19  1258   LABBLOO No Growth to date No Growth to date   , CMP   Recent Labs   Lab 05/22/19  1255  05/23/19  0108 05/23/19  0415 05/23/19  0720   *   < > 133*  133*  133* 132* 132*   K 4.0   < > 4.2  4.2  4.2 4.3 4.6   CL 87*   < > 97  97  97 97 96   CO2 8*   < > 5*  5*  5* 7* 8*   *   < > 67*  67*  67* 90 182*   BUN 19   < > 14  14  14 13 13   CREATININE 2.1*   < > 1.9*  1.9*  1.9* 1.9* 2.2*   CALCIUM 9.1   < > 7.9*  7.9*  7.9* 8.0* 7.0*   PROT 7.1  --  5.8* 5.5*  --    ALBUMIN 3.2*  --  2.6* 2.5*  --    BILITOT 1.4*  --  2.1* 2.2*  --    ALKPHOS 234*  --  258* 286*  --    *  --  2,959* 3,503*  --    *  --  1,645* 1,769*  --    ANIONGAP 36*   < > 31*  31*  31* 28* 28*   ESTGFRAFRICA 30*   < > 33*  33*  33* 33* 28*   EGFRNONAA 26*   < > 29*  29*  29* 29* 24*    < > = values in this interval not displayed.   , CBC   Recent Labs   Lab 05/22/19  1255  05/23/19  0415 05/23/19  0731   WBC 11.39  --  16.79*  --    HGB 14.2  --  12.6  --    HCT 47.5   < > 43.9 36     --  84*  --     < > = values in this interval not displayed.   , Troponin   Recent Labs   Lab 05/22/19  1255 05/22/19  1722   TROPONINI 0.071*  0.436*    and All pertinent lab results from the last 24 hours have been reviewed.    Significant Imaging: Echocardiogram:   2D echo with color flow doppler:   Results for orders placed or performed during the hospital encounter of 05/22/19   2D echo with color flow doppler   Result Value Ref Range    QEF 20 (A) 55 - 65    Aortic Valve Regurgitation MILD TO MODERATE (A)     Est. PA Systolic Pressure 69.22 (A)     Pericardial Effusion SMALL (A)     Mitral Valve Mobility MARKEDLY RESTRICTED     Mitral Valve Stenosis SEVERE (A)     Tricuspid Valve Regurgitation MODERATE TO SEVERE (A)

## 2019-05-23 NOTE — SIGNIFICANT EVENT
Updated spouse and daughter on continued decline  HR and arterial BP falling while at bedside despite max support of pressors x 3  Family inquired regarding cessation of life sustaining measures and options were explained  Spouse requests cessation of infusions with transition to comfort measures only; at this point we will not extubate/stop vent support as cardiac arrest appears eminent.   Attending MD notified of plan  Pt has limited signs of neuro function, we will monitor for any signs of discomfort/distress and treat for comfort if needed.    Tamera Reese, ACNP-BC  [facility]

## 2019-05-23 NOTE — ASSESSMENT & PLAN NOTE
Reportedly did receive some Rocuronium for unknown reason may be cause of comatose state  Due to Cardiac arrest will start TTM if CT head unremarkable for ICH  ICU neuro monitoring  5/23 - TTM stopped due to hemodynamic instability; neuro status complicated by severe prolonged acidosis

## 2019-05-23 NOTE — ASSESSMENT & PLAN NOTE
Support BP and repeat LFTs with CMP in AM  5/23 - worsening s/t continued shock state, optimize hemodynamics as able and monitor

## 2019-05-23 NOTE — HPI
Ms. Rankin is a 57 y/o female who presented to the ED post cardiac arrest with ROSC. EMS arrived to patient's home and she was hypoxic and hypoglycemic. BS 24 O2 sats in 70's. Patient was intubated in the field. Patient bradycardic resulting in PEA. CPR initiated with a ROSC. Patient initiated on pressors, central line placed. LA >12. Current admitted with cardiogenic shock with EF 20%, started on CRRT, but had to stop due to hypotension. Remains acidotic. Requiring multiple pressors for BP support. Family has expressed wishes to make patient DNR.

## 2019-05-23 NOTE — PLAN OF CARE
Problem: Adult Inpatient Plan of Care  Goal: Plan of Care Review  Outcome: Ongoing (interventions implemented as appropriate)  Pt hypotensive, acidotic, and hypothermic. CRRT stopped and blood returned. Bicarb given and multiple pressors started and titrated to max. Pupils dilated and sluggish. Pt DRUMMOND and nods head appropriately. Warming blanket still on. Pt tolerating ventilator. NSR. Anuric. All extremities dusky and mottled with cap refill >3 sec. B radial pulses +1. Only L PT heard on doppler, other LE pulses absent. 25g D50 given twice this AM. Turned as tolerated with pressure points protected. Spouse at bedside and daughter in waiting room. Each updated on pt's status. Questions encouraged and answered.

## 2019-05-23 NOTE — SUBJECTIVE & OBJECTIVE
Interval History: Pt was seen and examined in ICU. Met with pt's  and updated him on the medical problems. Discussed dialysis treatment with ICU nurse at 2 am by phone. Pt remained on CRRT overnight. Labs and management reviewed today.     Review of patient's allergies indicates:   Allergen Reactions    Acetaminophen Swelling     Current Facility-Administered Medications   Medication Frequency    0.9%  NaCl infusion (CRRT USE ONLY) Continuous    albuterol-ipratropium 2.5 mg-0.5 mg/3 mL nebulizer solution 3 mL Q6H WAKE    bisacodyl suppository 10 mg Daily PRN    chlorhexidine 0.12 % solution 15 mL BID    dextrose 50% injection 12.5 g PRN    DOPamine 400 mg in dextrose 5 % 250 mL infusion (premix) Continuous    famotidine (PF) injection 20 mg Daily    glucagon (human recombinant) injection 1 mg PRN    glucose chewable tablet 16 g PRN    glucose chewable tablet 24 g PRN    heparin 25,000 units in dextrose 5% (100 units/ml) IV bolus from bag - ADDITIONAL PRN BOLUS - 30 units/kg PRN    heparin 25,000 units in dextrose 5% (100 units/ml) IV bolus from bag - ADDITIONAL PRN BOLUS - 60 units/kg PRN    heparin 25,000 units in dextrose 5% 250 mL (100 units/mL) infusion HIGH INTENSITY nomogram - [facility] Continuous    hydrocortisone sodium succinate injection 100 mg Q8H    ibuprofen tablet 400 mg Q6H PRN    insulin aspart U-100 pen 0-5 Units Q6H PRN    levETIRAcetam in NaCl (iso-os) IVPB 500 mg Q12H    magnesium sulfate 2g in water 50mL IVPB (premix) PRN    magnesium sulfate 2g in water 50mL IVPB (premix) PRN    magnesium sulfate 2g in water 50mL IVPB (premix) PRN    norepinephrine 32 mg in dextrose 5 % 250 mL infusion Continuous    ondansetron injection 4 mg Q8H PRN    piperacillin-tazobactam 4.5 g in dextrose 5 % 100 mL IVPB (ready to mix system) Q8H    polyethylene glycol packet 17 g Daily    potassium chloride 40 mEq in 100 mL IVPB (FOR CENTRAL LINE ADMINISTRATION ONLY) PRN    And    potassium  chloride 20 mEq in 100 mL IVPB (FOR CENTRAL LINE ADMINISTRATION ONLY) PRN    And    potassium chloride 40 mEq in 100 mL IVPB (FOR CENTRAL LINE ADMINISTRATION ONLY) PRN    sodium bicarbonate 1 mEq/mL (8.4 %) 150 mEq in dextrose 5 % 1,150 mL infusion Continuous    sodium chloride 0.9% flush 10 mL PRN    sodium phosphate 20.01 mmol in dextrose 5 % 250 mL IVPB PRN    sodium phosphate 30 mmol in dextrose 5 % 250 mL IVPB PRN    sodium phosphate 39.99 mmol in dextrose 5 % 250 mL IVPB PRN    vasopressin (PITRESSIN) 0.2 Units/mL in dextrose 5 % 100 mL infusion Continuous    white petrolatum-mineral oil (LUBIFRESH P.M.) ophthalmic ointment BID       Objective:     Vital Signs (Most Recent):  Temp: (!) 94.3 °F (34.6 °C) (05/23/19 0715)  Pulse: 68 (05/23/19 1012)  Resp: (!) 30 (05/23/19 1012)  BP: (!) 86/69 (05/23/19 0800)  SpO2: 96 % (05/23/19 0830)  O2 Device (Oxygen Therapy): ventilator (05/23/19 1012) Vital Signs (24h Range):  Temp:  [91.2 °F (32.9 °C)-99 °F (37.2 °C)] 94.3 °F (34.6 °C)  Pulse:  [] 68  Resp:  [16-37] 30  SpO2:  [59 %-100 %] 96 %  BP: ()/(23-89) 86/69     Weight: 94.3 kg (207 lb 14.3 oz) (05/23/19 0538)  Body mass index is 33.55 kg/m².  Body surface area is 2.1 meters squared.    I/O last 3 completed shifts:  In: 4999.2 [I.V.:2099.2; NG/GT:300; IV Piggyback:2600]  Out: 995 [Urine:16; Other:979]    Physical Exam   Constitutional: She appears well-developed and well-nourished.   HENT:   Head: Normocephalic and atraumatic.   Neck: No JVD present.   Cardiovascular: Normal rate and regular rhythm. Exam reveals no friction rub.   Pulmonary/Chest: She has rales.   intubated   Abdominal: Soft.   Musculoskeletal: She exhibits no edema.   Skin: Skin is warm.   Nursing note and vitals reviewed.      Significant Labs: reviewed  BMP  Lab Results   Component Value Date     (L) 05/23/2019    K 4.6 05/23/2019    CL 96 05/23/2019    CO2 8 (LL) 05/23/2019    BUN 13 05/23/2019    CREATININE 2.2 (H)  05/23/2019    CALCIUM 7.0 (L) 05/23/2019    ANIONGAP 28 (H) 05/23/2019    ESTGFRAFRICA 28 (A) 05/23/2019    EGFRNONAA 24 (A) 05/23/2019     Lab Results   Component Value Date    WBC 16.79 (H) 05/23/2019    HGB 12.6 05/23/2019    HCT 36 05/23/2019     (H) 05/23/2019    PLT 84 (L) 05/23/2019     ABG  Recent Labs   Lab 05/23/19  0731   PH 6.923*   PO2 117*   PCO2 36.5   HCO3 7.5*   BE -25         Significant Imaging: reviewed

## 2019-05-23 NOTE — PROGRESS NOTES
Pt taken to CT and returned without incident.  JONO Sena NP at bedside to place trialysis cath to start CRRT.  NP spoke with  and got consent for trialysis cath.

## 2019-05-23 NOTE — ASSESSMENT & PLAN NOTE
Cont full vent support  VAP prophylaxis  Vent settings reviewed and adjusted multiple times  ICU Pulm Monitoring and support  5/23 - vent settings adjusted for optimal gas exchange

## 2019-05-23 NOTE — PROGRESS NOTES
Spoke with Jagjit Jaimes .  Theodore states pt is not a 's case.  Theodore states  home can be called.

## 2019-05-23 NOTE — ASSESSMENT & PLAN NOTE
Possible cause of cardiac arrest  Daughter reports patient has IVC filter  Check LE doppler US and cardiac Echo  Start Heparin infusion  If Echo shows RV strain will need to consider CTA chest to rule out PE even given SHEREE  1/23 - elevated PA pressures on echo but no evidence of acute rt heart strain and LE US negative, doubt benefit to CTA / TPA

## 2019-05-23 NOTE — CHAPLAIN
Initial visit with patient and family.  Provided ministries of listening, presence, and prayer.  At around 7:45am I came to the ICU waiting room to check with family members of another individual in ICU. Pt's daughter was in the waiting area crying so I took time to visit with her.  I prayed with her and she then asked me to go to the room and pray with pt.  I prayed with her and her spouse and continued to check with them throughout the day.  When I was with the family last they had learned that pt was going to pass so I took time to pray with them one more time before leaving.      Chaplain Noe Okeefe M.Div., [facility]

## 2019-05-23 NOTE — HOSPITAL COURSE
Patient 59 yo female admitted to hospital s/p cardiac arrest with ROSC. Patient with progressive decline resulting in a subsequent Cardiac Arrest. Called to bedside. Patient unresponsive. No breath or heart sounds heard by ascultation. Pupils unreactive. Family at bedside and comforted. Patient pronounced dead at 13:45 pm on 5/23/2019. COD cardiac Arrest

## 2019-05-23 NOTE — PLAN OF CARE
Problem: Adult Inpatient Plan of Care  Goal: Plan of Care Review  Outcome: Ongoing (interventions implemented as appropriate)  All vasoactive drips and bicarb withdrawn per families wishes.  Pt  with family at bedside.  Dr. Curry pronounced pt, time of death 1345.

## 2019-05-23 NOTE — ASSESSMENT & PLAN NOTE
Cont ICU hemodynamic monitoring and support on Levophed infusion  5/23 - TTM stopped due to instability; continue aggressive hemodynamic support; avoid aggressive warming or hyperthermia

## 2019-05-23 NOTE — ASSESSMENT & PLAN NOTE
57 y/o female with life threatening, severe metabolic acidosis post cardiac arrest:     Metabolic acidosis  Severe metabolic acidosis with life threatening level  Noted acidemia has worsened since admit  Lactic acidosis  Hypotension  K is normal  Cardiogenic shock  Acute kidney injury  Will require urgent dialysis  Will not tolerate conventional dialysis  Will provide CRRT. Discussed with HD nurse  Orders placed in epic     * Cardiopulmonary arrest with successful resuscitation  Has cardiac arrest at home  Significant h/o of multiple cardiac issues  Severe systolic CHF  Pulmonary HTN  H/o of PE  CAD  Noted young age  Discussed with ICU nurse. Drips, meds, IVF's reviewed and managed with ICU nurse.        ID: pulmonary infiltrates  Likely has pneumonia  May have aspirated  Abx reviewed           Plans and recommendations:  As discussed above  Reviewed dialysis orders with HD nurse  Total critical care time spent 70 minutes including time needed to review the records, the   patient evaluation, documentation, face-to-face discussion with the patient,   more than 50% of the time was spent on coordination of care and counseling.    Level V visit.  Multiple medical issues addressed. Medical care provided was in addition to providing dialysis, as documented. Additional charge will apply  Pt received multiple visits and evaluations in ICU.

## 2019-05-23 NOTE — ASSESSMENT & PLAN NOTE
Consulted Renal  Will attempt placement of Trialysis catheter and CRRT asap  Serial BMPs  5/23 - continue bicarb infusion; vent settings adjusted; poor prognosis for recovery with refractory severe acidosis

## 2019-05-23 NOTE — DISCHARGE SUMMARY
[facility] Hospital Medicine  Discharge Summary      Patient Name: Gurvinder Rankin  MRN: [id number]  Admission Date: 5/22/2019  Hospital Length of Stay: 1 days  Discharge Date and Time: 5/23/2019     Attending Physician: Nicole Curry MD   Discharging Provider: Nicole Curry MD  Primary Care Provider: Provider Notinsystem      HPI:   Patient is a 57 yo female presenting to ED s/p Cardiac Arrest with ROSC. Patient found to be hypoxic, and hypoglycemic at the scene by EMS. BS 24 O2 sats in 70's. Patient was intubated in the field. Patient bradycardic resulting in PEA. CPR initiated with a ROSC. Patient initiated on pressors, central line placed. LA >12, 30 mg /kg fluids administered. Repeat LA remained > 12. Re-perfussion exam performed. Patient admitted to ICU.      * No surgery found *      Hospital Course:   Patient 57 yo female admitted to hospital s/p cardiac arrest with ROSC. Patient with progressive decline resulting in a subsequent Cardiac Arrest. Called to bedside. Patient unresponsive. No breath or heart sounds heard by ascultation. Pupils unreactive. Family at bedside and comforted. Patient pronounced dead at 13:45 pm on 5/23/2019. COD cardiac Arrest     Consults:   Consults (From admission, onward)        Status Ordering Provider     Inpatient consult to Cardiology  Once     Provider:  Filipe Valenzuela MD    Completed NICOLE CURRY     Inpatient consult to Nephrology  Once     Provider:  Daniel Ramos MD    Acknowledged ADI QUACH          No new Assessment & Plan notes have been filed under this hospital service since the last note was generated.  Service: Hospital Medicine    Final Active Diagnoses:      Problems Resolved During this Admission:    Diagnosis Date Noted Date Resolved POA    PRINCIPAL PROBLEM:  Cardiopulmonary arrest with successful resuscitation [I46.9] 05/22/2019 05/23/2019 Yes    Comfort measures only status [Z51.5] 05/23/2019 05/23/2019 Not Applicable     Pulmonary infiltrates on CXR [R91.8] 2019 Yes    Acute on chronic systolic heart failure [I50.23] 2019 Yes    Acute respiratory failure with hypoxia and hypercapnia [J96.01, J96.02] 2019 Yes    SHEREE (acute kidney injury) [N17.9] 2019 Yes    Metabolic acidosis [E87.2] 2019 Yes    Shock liver [K72.00] 2019 Yes    Seizure disorder [G40.909] 2019 Yes     Chronic    History of pulmonary embolism [Z86.711] 2019 Yes     Chronic    Type 2 diabetes mellitus with hypoglycemia, without long-term current use of insulin [E11.649] 2019 Yes    Chronic constipation [K59.09] 2019 Yes     Chronic    Chronic midline low back pain w/ chronic narcotic use [M54.5, G89.29] 2019 Yes     Chronic    Hx of double heart valve surgery [Z95.2] 2019 Not Applicable     Chronic    Hypoxic encephalopathy [G93.1] 2019 Yes    Cardiac arrest [I46.9]  2019 Yes    Cardiogenic shock [R57.0]  2019 Yes    Acute on chronic congestive heart failure [I50.9]  2019 Yes    Sepsis [A41.9]  2019 Yes    Lactic acidosis [E87.2]  2019 Yes    Elevated troponin [R74.8]  2019 Yes    Pneumonia of both lungs due to infectious organism [J18.9]  2019 Yes       Discharged Condition:     Disposition:     Follow Up:    Patient Instructions:   No discharge procedures on file.    Significant Diagnostic Studies: Labs:   BMP:   Recent Labs   Lab 19  0108 19  0415 19  0720   GLU 67*  67*  67* 90 182*   *  133*  133* 132* 132*   K 4.2  4.2  4.2 4.3 4.6   CL 97  97  97 97 96   CO2 5*  5*  5* 7* 8*   BUN 14  14  14 13 13   CREATININE 1.9*  1.9*  1.9* 1.9* 2.2*   CALCIUM 7.9*  7.9*  7.9* 8.0* 7.0*   MG 2.2  2.2 3.2* 2.4   , CMP   Recent Labs   Lab  05/22/19  1255  05/23/19  0108 05/23/19  0415 05/23/19  0720   *   < > 133*  133*  133* 132* 132*   K 4.0   < > 4.2  4.2  4.2 4.3 4.6   CL 87*   < > 97  97  97 97 96   CO2 8*   < > 5*  5*  5* 7* 8*   *   < > 67*  67*  67* 90 182*   BUN 19   < > 14  14  14 13 13   CREATININE 2.1*   < > 1.9*  1.9*  1.9* 1.9* 2.2*   CALCIUM 9.1   < > 7.9*  7.9*  7.9* 8.0* 7.0*   PROT 7.1  --  5.8* 5.5*  --    ALBUMIN 3.2*  --  2.6* 2.5*  --    BILITOT 1.4*  --  2.1* 2.2*  --    ALKPHOS 234*  --  258* 286*  --    *  --  2,959* 3,503*  --    *  --  1,645* 1,769*  --    ANIONGAP 36*   < > 31*  31*  31* 28* 28*   ESTGFRAFRICA 30*   < > 33*  33*  33* 33* 28*   EGFRNONAA 26*   < > 29*  29*  29* 29* 24*    < > = values in this interval not displayed.   , CBC   Recent Labs   Lab 05/22/19  1255  05/23/19  0415 05/23/19  0731   WBC 11.39  --  16.79*  --    HGB 14.2  --  12.6  --    HCT 47.5   < > 43.9 36     --  84*  --     < > = values in this interval not displayed.   , Troponin   Recent Labs   Lab 05/22/19  1722   TROPONINI 0.436*    and All labs within the past 24 hours have been reviewed    Pending Diagnostic Studies:     Procedure Component Value Units Date/Time    Basic metabolic panel [[id number]] Collected:  05/23/19 0720    Order Status:  Sent Lab Status:  In process Updated:  05/23/19 0759    Specimen:  Blood     Magnesium [[id number]] Collected:  05/23/19 0720    Order Status:  Sent Lab Status:  In process Updated:  05/23/19 0759    Specimen:  Blood     Neuron Specific Enolase, Serum [[id number]] Collected:  05/22/19 2020    Order Status:  Sent Lab Status:  In process Updated:  05/23/19 0150    Specimen:  Blood     Phosphorus [[id number]] Collected:  05/23/19 0720    Order Status:  Sent Lab Status:  In process Updated:  05/23/19 0759    Specimen:  Blood          Medications:  Reconciled Home Medications:      Medication List      STOP taking these medications    allopurinol 300 MG  tablet  Commonly known as:  ZYLOPRIM     atorvastatin 20 MG tablet  Commonly known as:  LIPITOR     colchicine 0.6 mg tablet  Commonly known as:  COLCRYS     diltiaZEM 60 MG tablet  Commonly known as:  CARDIZEM     divalproex 125 mg capsule  Commonly known as:  DEPAKOTE SPRINKLE     furosemide 80 MG tablet  Commonly known as:  LASIX     linaclotide 290 mcg Cap  Commonly known as:  LINZESS     metFORMIN 500 MG tablet  Commonly known as:  GLUCOPHAGE     metOLazone 5 MG tablet  Commonly known as:  ZAROXOLYN     oxyCODONE 15 MG Tab  Commonly known as:  ROXICODONE     pantoprazole 20 MG tablet  Commonly known as:  PROTONIX     potassium chloride 20 mEq Pack  Commonly known as:  KLOR-CON     pregabalin 200 MG Cap  Commonly known as:  LYRICA     spironolactone 50 MG tablet  Commonly known as:  ALDACTONE     venlafaxine 150 MG Cp24  Commonly known as:  EFFEXOR-XR     zolpidem 10 mg Tab  Commonly known as:  AMBIEN            Indwelling Lines/Drains at time of discharge:   Lines/Drains/Airways     Central Venous Catheter Line                 Percutaneous Central Line Insertion/Assessment - triple lumen  05/22/19 1540 left internal jugular 1 day          Drain                 NG/OG Tube 05/22/19 1248 14 Fr. Right mouth 1 day         Urethral Catheter 05/22/19 1905 Temperature probe less than 1 day          Airway                 Airway - Non-Surgical 05/22/19 1239 Endotracheal Tube 1 day          Arterial Line                 Arterial Line 05/22/19 1900 Right Radial less than 1 day                Time spent on the discharge of patient: 35 minutes  Patient was seen and examined on the date of discharge and determined to be suitable for discharge.    Critical care time spent on the evaluation and treatment of severe organ dysfunction, review of pertinent labs and imaging studies, discussions with consulting providers and discussions with patient/family: 75 minutes.     Aristides Curry MD  Department of Hospital  Medicine  [facility]

## 2019-05-24 NOTE — PLAN OF CARE
19 0725   Final Note   Assessment Type Final Discharge Note   Anticipated Discharge Disposition

## 2019-05-24 NOTE — PHYSICIAN QUERY
PT Name: Gurvinder Rankin  MR #: [id number]     Physician Query Form - Documentation Clarification      CDS/: Rosangela Travis  RN CCDS             Contact information:[email or web address]    This form is a permanent document in the medical record.     Query Date: May 24, 2019    By submitting this query, we are merely seeking further clarification of documentation. Please utilize your independent clinical judgment when addressing the question(s) below.    The Medical record reflects the following:    Supporting Clinical Findings Location in Medical Record     diagnosis of CHF versus pneumonia    Likely has pneumonia     Pulmonary infiltrates on CXR    Vancomycin IV x 1 dose  Zosyn IV x 3 doses   ER provider note    Nephrology PN 5/22-5/23    DS    MAR     IV Lasix given then due to elevated LA > 12 given IVF bolus for sepsis protocol.    Pneumonia and Sepsis under resolved active problems on discharge summary      Very severe systolic CHF  Cardiogenic shock on 3 pressors   CN 5/22, PN 5/23      Discharge summary        Critical care note 5/23                                                                              Please clarify the sepsis and pneumonia diagnosis.  Thank you.    Provider Use Only    [    ] Sepsis and pneumonia ruled out    [    ] Pneumonia only ruled in    [  x  ] Sepsis and pneumonia ruled in    [    ] Other______________________                                                                                                               [  ] Clinically Undetermined

## 2019-05-25 LAB
BACTERIA SPEC AEROBE CULT: NORMAL
BACTERIA SPEC AEROBE CULT: NORMAL
GRAM STN SPEC: NORMAL

## 2019-05-26 NOTE — PHYSICIAN QUERY
PT Name: Gurvinder Rankin  MR #: [id number]    Physician Query Form - Cause and Effect Relationship Clarification      CDS/: Rosangela Travis RN CCDS           Contact information:[email or web address]    This form is a permanent document in the medical record.     Query Date: May 26, 2019    By submitting this query, we are merely seeking further clarification of documentation. Please utilize your independent clinical judgment when addressing the question(s) below.    The Medical record contains the following:  Supporting Clinical Findings   Location in record                                                                       Presentaiton concerning for CHF exacerbation with hypoxic arrest while patient was placed in a supine position for intubation; ultrasound did show bilateral B lines and Lasix was given; patient's blood pressure however has steadily declined here in the emergency department; x-ray was read as pneumonia and patient has elevated lactate; diagnosis of CHF versus pneumonia; at this point I am obligated to give 30 mils per kg of IV fluids; discussed with Hospital Medicine and they agree; will give 30 mils per kg of ideal body weight per hospital medicine recommendations; will also start patient on Levophed                                                                     Daughter stated patient has not taken diuretics in one week. Reports patient was fine yesterday and was speaking on phone with patient 5 minutes before EMS called.  This additional history also concerning for CHF exacerbation                                                     ER provider note                                    ER provider note                however chest x-ray was read as pneumonia and labs could also be consistent with a pneumonia septic shock picture; during her stay in the emergency department, patient's blood pressure did start to lower and patient did become hypotensive;   at this time with pneumonia  septic shock still a possibility, I discussed with hospital medicine about 30 mils per kg of IV fluids; hospital medicine agreed the patient needed a fluid bolus with septic shock not ruled out at this time                                                                        Sepsis and pneumonia ruled in per query                                                                                                                                            QF 5/24           Provider, please clarify if there is any correlation between _______Acute respiratory failure with hypoxia_________ and _______Sepsis_________.           Are the conditions:      [x    ] Due to or associated with each other   [  ] Unrelated to each other   [  ] Other (Please Specify): _________________________   [  ] Clinically Undetermined

## 2019-05-27 LAB
BACTERIA BLD CULT: NORMAL
BACTERIA BLD CULT: NORMAL
POCT GLUCOSE: 140 MG/DL (ref 70–110)

## 2023-01-19 NOTE — ASSESSMENT & PLAN NOTE
Conjuntivae and eyelids appear normal,  Sclerae : White without injection Review of pharmacy meds reveal she has not been filling her Lasix, Spirinolactone and Metolazone meds at home pharmacy  Due to shock unable to diurese aggresively  Will start CRRT  5/23-  RRT stopped due to instability; resume if hemodynamics permit
